# Patient Record
Sex: FEMALE | Race: WHITE | NOT HISPANIC OR LATINO
[De-identification: names, ages, dates, MRNs, and addresses within clinical notes are randomized per-mention and may not be internally consistent; named-entity substitution may affect disease eponyms.]

---

## 2017-03-21 ENCOUNTER — APPOINTMENT (OUTPATIENT)
Dept: GYNECOLOGIC ONCOLOGY | Facility: CLINIC | Age: 67
End: 2017-03-21

## 2017-03-21 VITALS
HEIGHT: 65 IN | WEIGHT: 210 LBS | BODY MASS INDEX: 34.99 KG/M2 | DIASTOLIC BLOOD PRESSURE: 85 MMHG | SYSTOLIC BLOOD PRESSURE: 170 MMHG

## 2017-03-27 ENCOUNTER — RX RENEWAL (OUTPATIENT)
Age: 67
End: 2017-03-27

## 2017-04-04 ENCOUNTER — APPOINTMENT (OUTPATIENT)
Dept: CARDIOLOGY | Facility: CLINIC | Age: 67
End: 2017-04-04

## 2017-04-04 ENCOUNTER — NON-APPOINTMENT (OUTPATIENT)
Age: 67
End: 2017-04-04

## 2017-04-04 VITALS
HEART RATE: 91 BPM | DIASTOLIC BLOOD PRESSURE: 82 MMHG | OXYGEN SATURATION: 94 % | HEIGHT: 65 IN | WEIGHT: 220 LBS | SYSTOLIC BLOOD PRESSURE: 136 MMHG | BODY MASS INDEX: 36.65 KG/M2

## 2017-06-30 ENCOUNTER — APPOINTMENT (OUTPATIENT)
Dept: GYNECOLOGIC ONCOLOGY | Facility: CLINIC | Age: 67
End: 2017-06-30

## 2017-06-30 VITALS
BODY MASS INDEX: 34.99 KG/M2 | WEIGHT: 210 LBS | SYSTOLIC BLOOD PRESSURE: 140 MMHG | DIASTOLIC BLOOD PRESSURE: 82 MMHG | HEIGHT: 65 IN

## 2017-07-02 RX ORDER — LORATADINE 5 MG/5 ML
SOLUTION, ORAL ORAL
Refills: 0 | Status: ACTIVE | COMMUNITY

## 2017-07-12 ENCOUNTER — APPOINTMENT (OUTPATIENT)
Dept: CARDIOLOGY | Facility: CLINIC | Age: 67
End: 2017-07-12

## 2017-07-12 ENCOUNTER — NON-APPOINTMENT (OUTPATIENT)
Age: 67
End: 2017-07-12

## 2017-07-12 VITALS
HEIGHT: 65 IN | DIASTOLIC BLOOD PRESSURE: 81 MMHG | WEIGHT: 222 LBS | HEART RATE: 98 BPM | BODY MASS INDEX: 36.99 KG/M2 | OXYGEN SATURATION: 96 % | SYSTOLIC BLOOD PRESSURE: 156 MMHG

## 2017-08-10 ENCOUNTER — APPOINTMENT (OUTPATIENT)
Dept: CARDIOLOGY | Facility: CLINIC | Age: 67
End: 2017-08-10
Payer: COMMERCIAL

## 2017-08-10 PROCEDURE — 93306 TTE W/DOPPLER COMPLETE: CPT

## 2017-08-24 ENCOUNTER — OUTPATIENT (OUTPATIENT)
Dept: OUTPATIENT SERVICES | Facility: HOSPITAL | Age: 67
LOS: 1 days | End: 2017-08-24
Payer: COMMERCIAL

## 2017-08-24 DIAGNOSIS — Z98.89 OTHER SPECIFIED POSTPROCEDURAL STATES: Chronic | ICD-10-CM

## 2017-08-24 DIAGNOSIS — Z90.710 ACQUIRED ABSENCE OF BOTH CERVIX AND UTERUS: Chronic | ICD-10-CM

## 2017-08-24 DIAGNOSIS — S91.309A UNSPECIFIED OPEN WOUND, UNSPECIFIED FOOT, INITIAL ENCOUNTER: ICD-10-CM

## 2017-08-24 PROCEDURE — G0463: CPT

## 2017-08-30 DIAGNOSIS — Z82.49 FAMILY HISTORY OF ISCHEMIC HEART DISEASE AND OTHER DISEASES OF THE CIRCULATORY SYSTEM: ICD-10-CM

## 2017-08-30 DIAGNOSIS — Z85.42 PERSONAL HISTORY OF MALIGNANT NEOPLASM OF OTHER PARTS OF UTERUS: ICD-10-CM

## 2017-08-30 DIAGNOSIS — F41.9 ANXIETY DISORDER, UNSPECIFIED: ICD-10-CM

## 2017-08-30 DIAGNOSIS — Z89.432 ACQUIRED ABSENCE OF LEFT FOOT: ICD-10-CM

## 2017-08-30 DIAGNOSIS — Z79.899 OTHER LONG TERM (CURRENT) DRUG THERAPY: ICD-10-CM

## 2017-08-30 DIAGNOSIS — E11.40 TYPE 2 DIABETES MELLITUS WITH DIABETIC NEUROPATHY, UNSPECIFIED: ICD-10-CM

## 2017-08-30 DIAGNOSIS — D89.9 DISORDER INVOLVING THE IMMUNE MECHANISM, UNSPECIFIED: ICD-10-CM

## 2017-08-30 DIAGNOSIS — Z83.3 FAMILY HISTORY OF DIABETES MELLITUS: ICD-10-CM

## 2017-08-30 DIAGNOSIS — Z79.82 LONG TERM (CURRENT) USE OF ASPIRIN: ICD-10-CM

## 2017-08-30 DIAGNOSIS — Z90.710 ACQUIRED ABSENCE OF BOTH CERVIX AND UTERUS: ICD-10-CM

## 2017-08-30 DIAGNOSIS — E66.9 OBESITY, UNSPECIFIED: ICD-10-CM

## 2017-08-30 DIAGNOSIS — I10 ESSENTIAL (PRIMARY) HYPERTENSION: ICD-10-CM

## 2017-08-30 DIAGNOSIS — M25.472 EFFUSION, LEFT ANKLE: ICD-10-CM

## 2017-08-30 DIAGNOSIS — Z88.0 ALLERGY STATUS TO PENICILLIN: ICD-10-CM

## 2017-08-30 DIAGNOSIS — M54.30 SCIATICA, UNSPECIFIED SIDE: ICD-10-CM

## 2017-08-30 DIAGNOSIS — Z83.518 FAMILY HISTORY OF OTHER SPECIFIED EYE DISORDER: ICD-10-CM

## 2017-08-30 DIAGNOSIS — E11.51 TYPE 2 DIABETES MELLITUS WITH DIABETIC PERIPHERAL ANGIOPATHY WITHOUT GANGRENE: ICD-10-CM

## 2017-08-30 DIAGNOSIS — R60.9 EDEMA, UNSPECIFIED: ICD-10-CM

## 2017-08-30 DIAGNOSIS — M12.9 ARTHROPATHY, UNSPECIFIED: ICD-10-CM

## 2017-09-12 ENCOUNTER — RX RENEWAL (OUTPATIENT)
Age: 67
End: 2017-09-12

## 2017-09-25 ENCOUNTER — RX RENEWAL (OUTPATIENT)
Age: 67
End: 2017-09-25

## 2017-10-10 ENCOUNTER — APPOINTMENT (OUTPATIENT)
Dept: CARDIOLOGY | Facility: CLINIC | Age: 67
End: 2017-10-10
Payer: COMMERCIAL

## 2017-10-10 ENCOUNTER — NON-APPOINTMENT (OUTPATIENT)
Age: 67
End: 2017-10-10

## 2017-10-10 VITALS
SYSTOLIC BLOOD PRESSURE: 118 MMHG | OXYGEN SATURATION: 93 % | HEIGHT: 65 IN | DIASTOLIC BLOOD PRESSURE: 81 MMHG | WEIGHT: 213 LBS | BODY MASS INDEX: 35.49 KG/M2 | HEART RATE: 84 BPM

## 2017-10-10 PROCEDURE — 93000 ELECTROCARDIOGRAM COMPLETE: CPT

## 2017-10-10 PROCEDURE — 99215 OFFICE O/P EST HI 40 MIN: CPT

## 2017-10-31 ENCOUNTER — APPOINTMENT (OUTPATIENT)
Dept: GYNECOLOGIC ONCOLOGY | Facility: CLINIC | Age: 67
End: 2017-10-31
Payer: COMMERCIAL

## 2017-10-31 VITALS
BODY MASS INDEX: 34.66 KG/M2 | DIASTOLIC BLOOD PRESSURE: 80 MMHG | HEIGHT: 65 IN | WEIGHT: 208 LBS | SYSTOLIC BLOOD PRESSURE: 142 MMHG

## 2017-10-31 PROCEDURE — 99214 OFFICE O/P EST MOD 30 MIN: CPT

## 2018-01-17 ENCOUNTER — APPOINTMENT (OUTPATIENT)
Dept: GYNECOLOGIC ONCOLOGY | Facility: CLINIC | Age: 68
End: 2018-01-17
Payer: COMMERCIAL

## 2018-01-17 VITALS
HEIGHT: 65 IN | DIASTOLIC BLOOD PRESSURE: 72 MMHG | BODY MASS INDEX: 34.16 KG/M2 | WEIGHT: 205 LBS | SYSTOLIC BLOOD PRESSURE: 136 MMHG

## 2018-01-17 PROCEDURE — 99214 OFFICE O/P EST MOD 30 MIN: CPT

## 2018-04-17 ENCOUNTER — APPOINTMENT (OUTPATIENT)
Dept: GYNECOLOGIC ONCOLOGY | Facility: CLINIC | Age: 68
End: 2018-04-17
Payer: COMMERCIAL

## 2018-04-17 VITALS
DIASTOLIC BLOOD PRESSURE: 82 MMHG | SYSTOLIC BLOOD PRESSURE: 145 MMHG | WEIGHT: 208 LBS | HEIGHT: 65 IN | BODY MASS INDEX: 34.66 KG/M2

## 2018-04-17 PROCEDURE — 99214 OFFICE O/P EST MOD 30 MIN: CPT

## 2018-04-17 RX ORDER — METFORMIN HYDROCHLORIDE 625 MG/1
TABLET ORAL
Refills: 0 | Status: DISCONTINUED | COMMUNITY
End: 2018-04-17

## 2018-04-17 RX ORDER — AMLODIPINE BESYLATE AND OLMESARTAN MEDOXOMIL 10; 40 MG/1; MG/1
TABLET, FILM COATED ORAL DAILY
Refills: 0 | Status: DISCONTINUED | COMMUNITY
End: 2018-04-17

## 2018-04-18 ENCOUNTER — APPOINTMENT (OUTPATIENT)
Dept: CARDIOLOGY | Facility: CLINIC | Age: 68
End: 2018-04-18
Payer: COMMERCIAL

## 2018-04-18 ENCOUNTER — NON-APPOINTMENT (OUTPATIENT)
Age: 68
End: 2018-04-18

## 2018-04-18 VITALS
HEIGHT: 65 IN | OXYGEN SATURATION: 96 % | SYSTOLIC BLOOD PRESSURE: 161 MMHG | BODY MASS INDEX: 35.82 KG/M2 | HEART RATE: 78 BPM | WEIGHT: 215 LBS | DIASTOLIC BLOOD PRESSURE: 89 MMHG

## 2018-04-18 PROCEDURE — 93000 ELECTROCARDIOGRAM COMPLETE: CPT

## 2018-04-18 PROCEDURE — 99214 OFFICE O/P EST MOD 30 MIN: CPT

## 2018-07-23 ENCOUNTER — APPOINTMENT (OUTPATIENT)
Dept: GYNECOLOGIC ONCOLOGY | Facility: CLINIC | Age: 68
End: 2018-07-23
Payer: COMMERCIAL

## 2018-07-23 VITALS
BODY MASS INDEX: 34.82 KG/M2 | DIASTOLIC BLOOD PRESSURE: 80 MMHG | SYSTOLIC BLOOD PRESSURE: 125 MMHG | HEIGHT: 65 IN | WEIGHT: 209 LBS

## 2018-07-23 DIAGNOSIS — I89.0 LYMPHEDEMA, NOT ELSEWHERE CLASSIFIED: ICD-10-CM

## 2018-07-23 PROCEDURE — 99214 OFFICE O/P EST MOD 30 MIN: CPT

## 2018-07-29 PROBLEM — I89.0 LYMPHEDEMA: Status: ACTIVE | Noted: 2018-04-17

## 2018-08-29 ENCOUNTER — RX RENEWAL (OUTPATIENT)
Age: 68
End: 2018-08-29

## 2018-09-10 ENCOUNTER — MEDICATION RENEWAL (OUTPATIENT)
Age: 68
End: 2018-09-10

## 2018-11-05 ENCOUNTER — APPOINTMENT (OUTPATIENT)
Dept: GYNECOLOGIC ONCOLOGY | Facility: CLINIC | Age: 68
End: 2018-11-05
Payer: COMMERCIAL

## 2018-11-05 VITALS
WEIGHT: 210 LBS | SYSTOLIC BLOOD PRESSURE: 128 MMHG | BODY MASS INDEX: 34.99 KG/M2 | HEIGHT: 65 IN | DIASTOLIC BLOOD PRESSURE: 82 MMHG

## 2018-11-05 PROCEDURE — 99214 OFFICE O/P EST MOD 30 MIN: CPT

## 2018-11-06 ENCOUNTER — NON-APPOINTMENT (OUTPATIENT)
Age: 68
End: 2018-11-06

## 2018-11-06 ENCOUNTER — APPOINTMENT (OUTPATIENT)
Dept: CARDIOLOGY | Facility: CLINIC | Age: 68
End: 2018-11-06
Payer: COMMERCIAL

## 2018-11-06 VITALS
OXYGEN SATURATION: 94 % | HEART RATE: 94 BPM | DIASTOLIC BLOOD PRESSURE: 83 MMHG | HEIGHT: 65 IN | BODY MASS INDEX: 35.82 KG/M2 | WEIGHT: 215 LBS | SYSTOLIC BLOOD PRESSURE: 136 MMHG

## 2018-11-06 PROCEDURE — 93000 ELECTROCARDIOGRAM COMPLETE: CPT

## 2018-11-06 PROCEDURE — 99214 OFFICE O/P EST MOD 30 MIN: CPT

## 2018-11-06 RX ORDER — MULTIVITAMIN
TABLET ORAL DAILY
Refills: 0 | Status: ACTIVE | COMMUNITY

## 2018-11-06 NOTE — HISTORY OF PRESENT ILLNESS
[FreeTextEntry1] : Lluvia Montoya presented to the office today for a followup cardiovascular evaluation. She was last seen in the office in April.\par \par She is now 68 years old, with a history of hypertension and dyslipidemia. She has what is most likely mild, diet-controlled diabetes. She is not known to have any underlying structural heart disease. In May of 2016 she had hysterectomy, which was complicated by distal arterial embolism. This was felt initially to potentially be a cardiac source of embolism or aortic, but her transesophageal echocardiogram was normal. 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 We evaluated her perioperatively. Her course was uncomplicated.\par \par At the time of the last visit, she was feeling well.  \par \par She continues to deal with lower extremity edema, which was felt to represent venous insufficiency, though she has more recently been told that it might simply represent lymphedema. There has been considerable back and forth on this.  She has been compliant with compression stockings and has been wearing stockings that reach the mid thigh. She reports no chest discomfort or difficulty breathing. She denies orthopnea and PND. She denies palpitations, dizziness and syncope. Her blood pressure has been controlled.  She's been experiencing some muscle cramping, and has been concern that perhaps this could be related to her simvastatin.

## 2018-11-06 NOTE — PHYSICAL EXAM
[General Appearance - Well Developed] : well developed [Normal Appearance] : normal appearance [Well Groomed] : well groomed [General Appearance - Well Nourished] : well nourished [No Deformities] : no deformities [General Appearance - In No Acute Distress] : no acute distress [Normal Conjunctiva] : the conjunctiva exhibited no abnormalities [Eyelids - No Xanthelasma] : the eyelids demonstrated no xanthelasmas [Normal Oral Mucosa] : normal oral mucosa [No Oral Pallor] : no oral pallor [No Oral Cyanosis] : no oral cyanosis [Normal Jugular Venous A Waves Present] : normal jugular venous A waves present [Normal Jugular Venous V Waves Present] : normal jugular venous V waves present [No Jugular Venous Hayward A Waves] : no jugular venous hayward A waves [Respiration, Rhythm And Depth] : normal respiratory rhythm and effort [Exaggerated Use Of Accessory Muscles For Inspiration] : no accessory muscle use [Auscultation Breath Sounds / Voice Sounds] : lungs were clear to auscultation bilaterally [Abdomen Soft] : soft [Abdomen Tenderness] : non-tender [Abdomen Mass (___ Cm)] : no abdominal mass palpated [Abnormal Walk] : normal gait [Gait - Sufficient For Exercise Testing] : the gait was sufficient for exercise testing [Nail Clubbing] : no clubbing of the fingernails [Cyanosis, Localized] : no localized cyanosis [Petechial Hemorrhages (___cm)] : no petechial hemorrhages [Skin Color & Pigmentation] : normal skin color and pigmentation [] : no rash [No Venous Stasis] : no venous stasis [Skin Lesions] : no skin lesions [No Skin Ulcers] : no skin ulcer [No Xanthoma] : no  xanthoma was observed [Oriented To Time, Place, And Person] : oriented to person, place, and time [Affect] : the affect was normal [Mood] : the mood was normal [No Anxiety] : not feeling anxious [Normal Rate] : normal [Normal S1] : normal S1 [Normal S2] : normal S2 [No Murmur] : no murmurs heard [Nonpitting Edema] : nonpitting edema present [S3] : no S3 [S4] : no S4 [Right Carotid Bruit] : no bruit heard over the right carotid [Left Carotid Bruit] : no bruit heard over the left carotid [Right Femoral Bruit] : no bruit heard over the right femoral artery [Left Femoral Bruit] : no bruit heard over the left femoral artery

## 2018-11-06 NOTE — DISCUSSION/SUMMARY
[FreeTextEntry1] : Lluvia has hypertension and dyslipidemia, and diabetes. She is not known to have structural heart disease. She suffered an arterial embolus to the lower extremity, requiring amputation of toes.\par \par Her biggest problem recently has been edema, and whether it represents venous insufficiency or lymphedema, I think our present management strategy with compression stockings as likely to be what we are left with.\par \par Her blood pressure is controlled now.\par \par She will try CoQ-10, and see if that helps. \par \par She will see me in 6 months if all is well.

## 2019-02-08 ENCOUNTER — APPOINTMENT (OUTPATIENT)
Dept: GYNECOLOGIC ONCOLOGY | Facility: CLINIC | Age: 69
End: 2019-02-08
Payer: COMMERCIAL

## 2019-02-08 PROCEDURE — 99213 OFFICE O/P EST LOW 20 MIN: CPT

## 2019-02-09 NOTE — PHYSICAL EXAM
[Abnormal] : Examination of extremities for edema and/or varicosities: Abnormal [Absent] : Adnexa(ae): Absent [Normal] : Recto-Vaginal Exam: Normal [de-identified] : 3+ L>R [de-identified] : neg mucosa but shortened.

## 2019-02-09 NOTE — DISCUSSION/SUMMARY
[Reviewed Clinical Lab Test(s)] : Results of clinical tests were reviewed. [Reviewed Radiology Report(s)] : Radiology reports were reviewed. [FreeTextEntry1] : She remains CARLOS.\par -We discussed her surveillance, the persistent SE of her treatment. We will request Med Onc reports. We discussed her decision re removal of the port, which she is reluctant to do. We discussed the adriana hx of her condition, r/o recurrence, and her ongoing status. For now, she'll keep the port. \par -We have discussed the finding of lipedema.   \par -We discussed her BHM. We discussed her imaging. She has plans for f/u with Dr HAWKINS.   \par -We have discussed the GI eval and imaging as well as HR's rec for f/u. \par -We discussed bone health, and the role of exercise as tolerated, Ca and Vit D supplementation.\par -We reviewed her Ortho issues.\par -Her instructions were reviewed. \par -All Q/A.\par -She'll RTO in 3m. \par

## 2019-02-09 NOTE — HISTORY OF PRESENT ILLNESS
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 Singleton\par PCP: Dr. Theron Pinedo\par Breast Surgeon: Dr. Vang\par Rad Onc: Dr PATEL Ray\par Med Onc: Rossy De Santiago and Chris\par Vasc: Dr White\par \par She RTO feeling well and notes no VB, VD, and pain. She saw HR re the GB polyp and he advised f/u in 1 year. No recent Med Onc reports.\par \par ROS: no GI or  issues. She reports multiple orth issues and recently had a l knee injection. We have discussed the diagnosis of "lipedema with only minimal lymphedema." \par \par CT Jul 2018 - CARLOS, thyroid nodule, GB adenomyomatosis (w/o change [prior reports reviewed]), GB gravel vs stones. We have discussed the report in detail. \par \par US: Aug 2018 - GB polyp.\par \par : May 2017=3. \par \par BHM: She reports seeing SP for a CBE. Mgm/US: Jan 2018 - BIRADS 2.\par DEXA: Jan 2017 - osteopenia. Taking Ca and Vit D.

## 2019-04-15 ENCOUNTER — NON-APPOINTMENT (OUTPATIENT)
Age: 69
End: 2019-04-15

## 2019-04-15 ENCOUNTER — APPOINTMENT (OUTPATIENT)
Dept: CARDIOLOGY | Facility: CLINIC | Age: 69
End: 2019-04-15
Payer: COMMERCIAL

## 2019-04-15 VITALS
WEIGHT: 214 LBS | HEART RATE: 76 BPM | SYSTOLIC BLOOD PRESSURE: 137 MMHG | DIASTOLIC BLOOD PRESSURE: 84 MMHG | OXYGEN SATURATION: 96 % | HEIGHT: 65 IN | BODY MASS INDEX: 35.65 KG/M2

## 2019-04-15 PROCEDURE — 99214 OFFICE O/P EST MOD 30 MIN: CPT

## 2019-04-15 PROCEDURE — 93000 ELECTROCARDIOGRAM COMPLETE: CPT

## 2019-04-15 NOTE — HISTORY OF PRESENT ILLNESS
[FreeTextEntry1] : Lluvia Montoya presented to the office today for a followup cardiovascular evaluation. She was last seen in the office in November.\par \par She is now 68 years old, with a history of hypertension and dyslipidemia. She has what is most likely mild, diet-controlled diabetes. She is not known to have any underlying structural heart disease. In May of 2016 she had hysterectomy, which was complicated by distal arterial embolism. This was felt initially to potentially be a cardiac source of embolism or aortic, but her transesophageal echocardiogram was normal. 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 We evaluated her perioperatively. Her course was uncomplicated.\par \par At the time of the last visit, she was feeling well.  \par \par She continues to deal with lower extremity edema, which is felt to represent a combination of venous insufficiency and lymphedema  She has been compliant with compression stockings and has been wearing stockings that reach the mid thigh. She reports no chest discomfort or difficulty breathing. She denies orthopnea and PND. Her legs feel heavy, and she has had knee issues, at least in part from the edema.  She denies palpitations, dizziness and syncope. Her blood pressure has been controlled. Her port for chemotherapy will be removed in the near future.

## 2019-04-15 NOTE — PHYSICAL EXAM
[General Appearance - Well Developed] : well developed [Normal Appearance] : normal appearance [Well Groomed] : well groomed [General Appearance - Well Nourished] : well nourished [General Appearance - In No Acute Distress] : no acute distress [No Deformities] : no deformities [Normal Conjunctiva] : the conjunctiva exhibited no abnormalities [Normal Oral Mucosa] : normal oral mucosa [Eyelids - No Xanthelasma] : the eyelids demonstrated no xanthelasmas [No Oral Cyanosis] : no oral cyanosis [No Oral Pallor] : no oral pallor [Normal Jugular Venous A Waves Present] : normal jugular venous A waves present [Normal Jugular Venous V Waves Present] : normal jugular venous V waves present [No Jugular Venous Hayward A Waves] : no jugular venous hayward A waves [Respiration, Rhythm And Depth] : normal respiratory rhythm and effort [Exaggerated Use Of Accessory Muscles For Inspiration] : no accessory muscle use [Auscultation Breath Sounds / Voice Sounds] : lungs were clear to auscultation bilaterally [Abdomen Tenderness] : non-tender [Abdomen Soft] : soft [Abdomen Mass (___ Cm)] : no abdominal mass palpated [Abnormal Walk] : normal gait [Gait - Sufficient For Exercise Testing] : the gait was sufficient for exercise testing [Nail Clubbing] : no clubbing of the fingernails [Petechial Hemorrhages (___cm)] : no petechial hemorrhages [Cyanosis, Localized] : no localized cyanosis [Skin Color & Pigmentation] : normal skin color and pigmentation [] : no rash [No Venous Stasis] : no venous stasis [Skin Lesions] : no skin lesions [No Skin Ulcers] : no skin ulcer [No Xanthoma] : no  xanthoma was observed [Oriented To Time, Place, And Person] : oriented to person, place, and time [Affect] : the affect was normal [Mood] : the mood was normal [Normal Rate] : normal [No Anxiety] : not feeling anxious [Normal S2] : normal S2 [Normal S1] : normal S1 [S4] : no S4 [S3] : no S3 [No Murmur] : no murmurs heard [Right Carotid Bruit] : no bruit heard over the right carotid [Left Carotid Bruit] : no bruit heard over the left carotid [Right Femoral Bruit] : no bruit heard over the right femoral artery [Left Femoral Bruit] : no bruit heard over the left femoral artery [Nonpitting Edema] : nonpitting edema present

## 2019-04-15 NOTE — DISCUSSION/SUMMARY
[FreeTextEntry1] : Lluvia has hypertension and dyslipidemia, and diabetes. She is not known to have structural heart disease. She suffered an arterial embolus to the lower extremity, requiring amputation of toes.\par \par Her biggest problem recently has been edema, and whether it represents venous insufficiency or lymphedema, I think our present management strategy with compression stockings as likely to be what we are left with.\par \par Her blood pressure is controlled sufficiently. She has had LVH and we will repeat an echo.\par \par She will see me in 6 months if all is well. She can hold aspirin for a day or tow as requested, prior to removal of the port.

## 2019-04-21 ENCOUNTER — TRANSCRIPTION ENCOUNTER (OUTPATIENT)
Age: 69
End: 2019-04-21

## 2019-04-22 ENCOUNTER — RESULT REVIEW (OUTPATIENT)
Age: 69
End: 2019-04-22

## 2019-04-22 ENCOUNTER — OUTPATIENT (OUTPATIENT)
Dept: OUTPATIENT SERVICES | Facility: HOSPITAL | Age: 69
LOS: 1 days | End: 2019-04-22
Payer: COMMERCIAL

## 2019-04-22 DIAGNOSIS — Z90.710 ACQUIRED ABSENCE OF BOTH CERVIX AND UTERUS: Chronic | ICD-10-CM

## 2019-04-22 DIAGNOSIS — Z98.89 OTHER SPECIFIED POSTPROCEDURAL STATES: Chronic | ICD-10-CM

## 2019-04-22 DIAGNOSIS — Z45.1 ENCOUNTER FOR ADJUSTMENT AND MANAGEMENT OF INFUSION PUMP: ICD-10-CM

## 2019-04-22 LAB — SURGICAL PATHOLOGY STUDY: SIGNIFICANT CHANGE UP

## 2019-04-22 PROCEDURE — 36590 REMOVAL TUNNELED CV CATH: CPT

## 2019-04-22 PROCEDURE — 88300 SURGICAL PATH GROSS: CPT | Mod: 26

## 2019-04-22 PROCEDURE — 88300 SURGICAL PATH GROSS: CPT

## 2019-05-06 ENCOUNTER — APPOINTMENT (OUTPATIENT)
Dept: CARDIOLOGY | Facility: CLINIC | Age: 69
End: 2019-05-06
Payer: COMMERCIAL

## 2019-05-06 PROCEDURE — 93306 TTE W/DOPPLER COMPLETE: CPT

## 2019-05-16 ENCOUNTER — RX RENEWAL (OUTPATIENT)
Age: 69
End: 2019-05-16

## 2019-05-28 ENCOUNTER — RX RENEWAL (OUTPATIENT)
Age: 69
End: 2019-05-28

## 2019-06-03 ENCOUNTER — APPOINTMENT (OUTPATIENT)
Dept: GYNECOLOGIC ONCOLOGY | Facility: CLINIC | Age: 69
End: 2019-06-03
Payer: COMMERCIAL

## 2019-06-03 VITALS
HEIGHT: 65 IN | SYSTOLIC BLOOD PRESSURE: 136 MMHG | DIASTOLIC BLOOD PRESSURE: 80 MMHG | BODY MASS INDEX: 33.32 KG/M2 | WEIGHT: 200 LBS

## 2019-06-03 DIAGNOSIS — R60.0 LOCALIZED EDEMA: ICD-10-CM

## 2019-06-03 PROCEDURE — 99214 OFFICE O/P EST MOD 30 MIN: CPT

## 2019-09-16 ENCOUNTER — APPOINTMENT (OUTPATIENT)
Dept: GYNECOLOGIC ONCOLOGY | Facility: CLINIC | Age: 69
End: 2019-09-16
Payer: COMMERCIAL

## 2019-09-16 PROCEDURE — 99213 OFFICE O/P EST LOW 20 MIN: CPT

## 2019-10-07 ENCOUNTER — APPOINTMENT (OUTPATIENT)
Dept: CARDIOLOGY | Facility: CLINIC | Age: 69
End: 2019-10-07
Payer: COMMERCIAL

## 2019-10-07 ENCOUNTER — NON-APPOINTMENT (OUTPATIENT)
Age: 69
End: 2019-10-07

## 2019-10-07 VITALS
HEIGHT: 65 IN | WEIGHT: 217 LBS | HEART RATE: 80 BPM | BODY MASS INDEX: 36.15 KG/M2 | OXYGEN SATURATION: 94 % | SYSTOLIC BLOOD PRESSURE: 123 MMHG | DIASTOLIC BLOOD PRESSURE: 83 MMHG

## 2019-10-07 PROCEDURE — 93000 ELECTROCARDIOGRAM COMPLETE: CPT

## 2019-10-07 PROCEDURE — 99214 OFFICE O/P EST MOD 30 MIN: CPT

## 2019-10-07 NOTE — HISTORY OF PRESENT ILLNESS
[FreeTextEntry1] : Lluvia David presented to the office today for a followup cardiovascular evaluation. She was last seen in the office in April.\par \par She is now 69 years old, with a history of hypertension and dyslipidemia. She has what is most likely mild, diet-controlled diabetes. She is not known to have any underlying structural heart disease. In May of 2016 she had hysterectomy, which was complicated by distal arterial embolism. This was felt initially to potentially be a cardiac source of embolism or aortic, but her transesophageal echocardiogram was normal. 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 We evaluated her perioperatively. Her course was uncomplicated. She has remained on aspirin.\par \par At the time of the last visit, she was feeling well.  \par \par She continues to deal with lower extremity edema, which is felt to represent a combination of venous insufficiency and lymphedema  She has been compliant with compression stockings and has been wearing stockings that reach the mid thigh. She reports no chest discomfort or difficulty breathing. She denies orthopnea and PND.  She denies palpitations, dizziness and syncope. Her blood pressure has been controlled. Her port for chemotherapy was removed. She has been having some muscular cramping, especially the inner thigh of the right leg. She is concerned that perhaps this is related to simvastatin.

## 2019-10-07 NOTE — PHYSICAL EXAM
[General Appearance - Well Developed] : well developed [Normal Appearance] : normal appearance [Well Groomed] : well groomed [General Appearance - Well Nourished] : well nourished [No Deformities] : no deformities [General Appearance - In No Acute Distress] : no acute distress [Normal Conjunctiva] : the conjunctiva exhibited no abnormalities [Eyelids - No Xanthelasma] : the eyelids demonstrated no xanthelasmas [Normal Oral Mucosa] : normal oral mucosa [No Oral Pallor] : no oral pallor [No Oral Cyanosis] : no oral cyanosis [Normal Jugular Venous A Waves Present] : normal jugular venous A waves present [Normal Jugular Venous V Waves Present] : normal jugular venous V waves present [No Jugular Venous Hayward A Waves] : no jugular venous hayward A waves [Respiration, Rhythm And Depth] : normal respiratory rhythm and effort [Exaggerated Use Of Accessory Muscles For Inspiration] : no accessory muscle use [Auscultation Breath Sounds / Voice Sounds] : lungs were clear to auscultation bilaterally [Abdomen Soft] : soft [Abdomen Tenderness] : non-tender [Abdomen Mass (___ Cm)] : no abdominal mass palpated [Abnormal Walk] : normal gait [Gait - Sufficient For Exercise Testing] : the gait was sufficient for exercise testing [Nail Clubbing] : no clubbing of the fingernails [Cyanosis, Localized] : no localized cyanosis [Petechial Hemorrhages (___cm)] : no petechial hemorrhages [Skin Color & Pigmentation] : normal skin color and pigmentation [] : no rash [No Venous Stasis] : no venous stasis [Skin Lesions] : no skin lesions [No Xanthoma] : no  xanthoma was observed [No Skin Ulcers] : no skin ulcer [Oriented To Time, Place, And Person] : oriented to person, place, and time [Affect] : the affect was normal [Mood] : the mood was normal [No Anxiety] : not feeling anxious [Normal Rate] : normal [Normal S1] : normal S1 [Normal S2] : normal S2 [S3] : no S3 [S4] : no S4 [No Murmur] : no murmurs heard [Right Carotid Bruit] : no bruit heard over the right carotid [Left Carotid Bruit] : no bruit heard over the left carotid [Right Femoral Bruit] : no bruit heard over the right femoral artery [Left Femoral Bruit] : no bruit heard over the left femoral artery [Nonpitting Edema] : nonpitting edema present

## 2019-10-07 NOTE — DISCUSSION/SUMMARY
[FreeTextEntry1] : Lluvia has hypertension and dyslipidemia, and diabetes. She is not known to have structural heart disease. She suffered an arterial embolus to the lower extremity, requiring amputation of toes.\par \par Her biggest problem recently has been edema, and whether it represents venous insufficiency or lymphedema, I think our present management strategy with compression stockings as likely to be what we are left with.\par \par Her blood pressure is controlled sufficiently.  \par \par She will try holding simvastatin. She will call me in the next month, and let me know whether her cramping has resolved. If so, we can consider changing her to an alternative statin, perhaps Crestor.\par \par She will see me in 6 months if all is well.

## 2019-11-19 ENCOUNTER — OTHER (OUTPATIENT)
Age: 69
End: 2019-11-19

## 2019-12-03 ENCOUNTER — APPOINTMENT (OUTPATIENT)
Dept: CARDIOLOGY | Facility: CLINIC | Age: 69
End: 2019-12-03
Payer: COMMERCIAL

## 2019-12-03 PROCEDURE — 93880 EXTRACRANIAL BILAT STUDY: CPT

## 2020-01-22 ENCOUNTER — APPOINTMENT (OUTPATIENT)
Dept: GYNECOLOGIC ONCOLOGY | Facility: CLINIC | Age: 70
End: 2020-01-22
Payer: COMMERCIAL

## 2020-01-22 VITALS
HEART RATE: 88 BPM | WEIGHT: 205 LBS | SYSTOLIC BLOOD PRESSURE: 116 MMHG | HEIGHT: 65 IN | DIASTOLIC BLOOD PRESSURE: 80 MMHG | BODY MASS INDEX: 34.16 KG/M2

## 2020-01-22 PROCEDURE — 99213 OFFICE O/P EST LOW 20 MIN: CPT

## 2020-04-20 ENCOUNTER — APPOINTMENT (OUTPATIENT)
Dept: GYNECOLOGIC ONCOLOGY | Facility: CLINIC | Age: 70
End: 2020-04-20

## 2020-06-02 ENCOUNTER — APPOINTMENT (OUTPATIENT)
Dept: CARDIOLOGY | Facility: CLINIC | Age: 70
End: 2020-06-02
Payer: COMMERCIAL

## 2020-06-02 ENCOUNTER — NON-APPOINTMENT (OUTPATIENT)
Age: 70
End: 2020-06-02

## 2020-06-02 VITALS
HEIGHT: 65 IN | WEIGHT: 220 LBS | OXYGEN SATURATION: 95 % | HEART RATE: 97 BPM | BODY MASS INDEX: 36.65 KG/M2 | SYSTOLIC BLOOD PRESSURE: 141 MMHG | DIASTOLIC BLOOD PRESSURE: 88 MMHG

## 2020-06-02 PROCEDURE — 99214 OFFICE O/P EST MOD 30 MIN: CPT

## 2020-06-02 PROCEDURE — 93000 ELECTROCARDIOGRAM COMPLETE: CPT

## 2020-06-02 NOTE — HISTORY OF PRESENT ILLNESS
[FreeTextEntry1] : Lluvia David presented to the office today for a followup cardiovascular evaluation. She was last seen in the office in October.\par \par She is now 70 years old, with a history of hypertension and dyslipidemia. She has what is most likely mild, diet-controlled diabetes. She is not known to have any underlying structural heart disease. In May of 2016 she had hysterectomy, which was complicated by distal arterial embolism. This was felt initially to potentially be a cardiac source of embolism or aortic, but her transesophageal echocardiogram was normal. 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 We evaluated her perioperatively. Her course was uncomplicated. She has remained on aspirin.\par \par At the time of the last visit, she was feeling well.  \par \par She continues to deal with lower extremity edema, which is felt to represent a combination of venous insufficiency and lymphedema  She has been compliant with compression stockings and has been wearing stockings that reach the mid thigh. She reports no chest discomfort or difficulty breathing. She denies orthopnea and PND.  She denies palpitations, dizziness and syncope. Her blood pressure has been controlled. Her port for chemotherapy was removed. She has been having some muscular cramping, especially the inner thigh of the right leg. This is unchanged.  Her cramping overall is better on Crestor. She is concerned about "blood clots."

## 2020-06-02 NOTE — DISCUSSION/SUMMARY
[FreeTextEntry1] : Lluvia has hypertension and dyslipidemia, and diabetes. She is not known to have structural heart disease. She suffered an arterial embolus to the lower extremity, requiring amputation of toes.\par \par Her biggest problem recently has been edema, and whether it represents venous insufficiency or lymphedema, I think our present management strategy with compression stockings as likely to be what we are left with.\par \par Her blood pressure is high today. It has been better at her PCP.  I will leave things alone for now.\par \par She remains concerned about arterial and venous thrombosis. I have suggested ultrasounds. I will be in contact with her to discuss the results.

## 2020-06-02 NOTE — PHYSICAL EXAM
[General Appearance - Well Developed] : well developed [Normal Appearance] : normal appearance [Well Groomed] : well groomed [No Deformities] : no deformities [General Appearance - Well Nourished] : well nourished [General Appearance - In No Acute Distress] : no acute distress [Normal Conjunctiva] : the conjunctiva exhibited no abnormalities [Eyelids - No Xanthelasma] : the eyelids demonstrated no xanthelasmas [Normal Oral Mucosa] : normal oral mucosa [No Oral Pallor] : no oral pallor [Normal Jugular Venous A Waves Present] : normal jugular venous A waves present [No Oral Cyanosis] : no oral cyanosis [Normal Jugular Venous V Waves Present] : normal jugular venous V waves present [No Jugular Venous Hayward A Waves] : no jugular venous hayward A waves [Respiration, Rhythm And Depth] : normal respiratory rhythm and effort [Abdomen Soft] : soft [Auscultation Breath Sounds / Voice Sounds] : lungs were clear to auscultation bilaterally [Exaggerated Use Of Accessory Muscles For Inspiration] : no accessory muscle use [Abdomen Mass (___ Cm)] : no abdominal mass palpated [Abdomen Tenderness] : non-tender [Gait - Sufficient For Exercise Testing] : the gait was sufficient for exercise testing [Abnormal Walk] : normal gait [Cyanosis, Localized] : no localized cyanosis [Petechial Hemorrhages (___cm)] : no petechial hemorrhages [Nail Clubbing] : no clubbing of the fingernails [Skin Color & Pigmentation] : normal skin color and pigmentation [] : no rash [No Venous Stasis] : no venous stasis [Skin Lesions] : no skin lesions [No Xanthoma] : no  xanthoma was observed [No Skin Ulcers] : no skin ulcer [Mood] : the mood was normal [Oriented To Time, Place, And Person] : oriented to person, place, and time [Affect] : the affect was normal [No Anxiety] : not feeling anxious [Normal Rate] : normal [Normal S2] : normal S2 [Normal S1] : normal S1 [No Murmur] : no murmurs heard [Nonpitting Edema] : nonpitting edema present [S3] : no S3 [S4] : no S4 [Left Carotid Bruit] : no bruit heard over the left carotid [Left Femoral Bruit] : no bruit heard over the left femoral artery [Right Carotid Bruit] : no bruit heard over the right carotid [Right Femoral Bruit] : no bruit heard over the right femoral artery

## 2020-06-08 ENCOUNTER — APPOINTMENT (OUTPATIENT)
Dept: CARDIOLOGY | Facility: CLINIC | Age: 70
End: 2020-06-08
Payer: COMMERCIAL

## 2020-06-08 PROCEDURE — 93970 EXTREMITY STUDY: CPT

## 2020-06-08 PROCEDURE — 93925 LOWER EXTREMITY STUDY: CPT

## 2020-08-31 ENCOUNTER — APPOINTMENT (OUTPATIENT)
Dept: GYNECOLOGIC ONCOLOGY | Facility: CLINIC | Age: 70
End: 2020-08-31
Payer: MEDICARE

## 2020-08-31 VITALS
WEIGHT: 214 LBS | BODY MASS INDEX: 35.65 KG/M2 | HEART RATE: 93 BPM | HEIGHT: 65 IN | SYSTOLIC BLOOD PRESSURE: 142 MMHG | DIASTOLIC BLOOD PRESSURE: 84 MMHG

## 2020-08-31 PROCEDURE — 99214 OFFICE O/P EST MOD 30 MIN: CPT

## 2020-11-04 ENCOUNTER — RX RENEWAL (OUTPATIENT)
Age: 70
End: 2020-11-04

## 2020-11-30 ENCOUNTER — APPOINTMENT (OUTPATIENT)
Dept: GYNECOLOGIC ONCOLOGY | Facility: CLINIC | Age: 70
End: 2020-11-30
Payer: MEDICARE

## 2020-11-30 VITALS
BODY MASS INDEX: 36.99 KG/M2 | WEIGHT: 222 LBS | SYSTOLIC BLOOD PRESSURE: 128 MMHG | HEIGHT: 65 IN | HEART RATE: 108 BPM | DIASTOLIC BLOOD PRESSURE: 76 MMHG

## 2020-11-30 DIAGNOSIS — K82.4 CHOLESTEROLOSIS OF GALLBLADDER: ICD-10-CM

## 2020-11-30 PROCEDURE — 99214 OFFICE O/P EST MOD 30 MIN: CPT

## 2020-12-15 ENCOUNTER — APPOINTMENT (OUTPATIENT)
Dept: CARDIOLOGY | Facility: CLINIC | Age: 70
End: 2020-12-15
Payer: MEDICARE

## 2020-12-15 ENCOUNTER — NON-APPOINTMENT (OUTPATIENT)
Age: 70
End: 2020-12-15

## 2020-12-15 VITALS
OXYGEN SATURATION: 97 % | SYSTOLIC BLOOD PRESSURE: 174 MMHG | HEART RATE: 86 BPM | BODY MASS INDEX: 36.65 KG/M2 | HEIGHT: 65 IN | WEIGHT: 220 LBS | DIASTOLIC BLOOD PRESSURE: 79 MMHG

## 2020-12-15 VITALS — SYSTOLIC BLOOD PRESSURE: 165 MMHG | DIASTOLIC BLOOD PRESSURE: 80 MMHG

## 2020-12-15 PROCEDURE — 93000 ELECTROCARDIOGRAM COMPLETE: CPT

## 2020-12-15 PROCEDURE — 99214 OFFICE O/P EST MOD 30 MIN: CPT

## 2020-12-15 NOTE — DISCUSSION/SUMMARY
[FreeTextEntry1] : Lluvia has hypertension and dyslipidemia, and diabetes. She is not known to have structural heart disease. She suffered an arterial embolus to the lower extremity, requiring amputation of toes.\par \par Her biggest problem recently has been edema, and whether it represents venous insufficiency or lymphedema, I think our present management strategy with compression stockings as likely to be what we are left with.\par \par Her blood pressure is high today. It has been better at her PCP.  I have asked that she start checking her blood pressure at home twice daily over the next 2 weeks.  She will drop off a list of readings.\par \par She will repeat her echocardiogram.  She has had a degree of LVH, somewhat improving over the years.  If her LVH is worse, we might need to be more aggressive with blood pressure control.

## 2020-12-15 NOTE — PHYSICAL EXAM
[General Appearance - Well Developed] : well developed [Normal Appearance] : normal appearance [Well Groomed] : well groomed [General Appearance - Well Nourished] : well nourished [No Deformities] : no deformities [General Appearance - In No Acute Distress] : no acute distress [Normal Conjunctiva] : the conjunctiva exhibited no abnormalities [Eyelids - No Xanthelasma] : the eyelids demonstrated no xanthelasmas [Normal Oral Mucosa] : normal oral mucosa [No Oral Pallor] : no oral pallor [No Oral Cyanosis] : no oral cyanosis [Normal Jugular Venous A Waves Present] : normal jugular venous A waves present [Normal Jugular Venous V Waves Present] : normal jugular venous V waves present [No Jugular Venous Hayward A Waves] : no jugular venous hayward A waves [Respiration, Rhythm And Depth] : normal respiratory rhythm and effort [Exaggerated Use Of Accessory Muscles For Inspiration] : no accessory muscle use [Auscultation Breath Sounds / Voice Sounds] : lungs were clear to auscultation bilaterally [Abdomen Soft] : soft [Abdomen Tenderness] : non-tender [Abdomen Mass (___ Cm)] : no abdominal mass palpated [Abnormal Walk] : normal gait [Gait - Sufficient For Exercise Testing] : the gait was sufficient for exercise testing [Nail Clubbing] : no clubbing of the fingernails [Cyanosis, Localized] : no localized cyanosis [Petechial Hemorrhages (___cm)] : no petechial hemorrhages [Skin Color & Pigmentation] : normal skin color and pigmentation [] : no rash [No Venous Stasis] : no venous stasis [Skin Lesions] : no skin lesions [No Skin Ulcers] : no skin ulcer [No Xanthoma] : no  xanthoma was observed [Oriented To Time, Place, And Person] : oriented to person, place, and time [Affect] : the affect was normal [Mood] : the mood was normal [No Anxiety] : not feeling anxious [Normal Rate] : normal [Normal S1] : normal S1 [Normal S2] : normal S2 [S3] : no S3 [S4] : no S4 [No Murmur] : no murmurs heard [Right Carotid Bruit] : no bruit heard over the right carotid [Left Carotid Bruit] : no bruit heard over the left carotid [Right Femoral Bruit] : no bruit heard over the right femoral artery [Left Femoral Bruit] : no bruit heard over the left femoral artery [Nonpitting Edema] : nonpitting edema present

## 2020-12-15 NOTE — HISTORY OF PRESENT ILLNESS
[FreeTextEntry1] : Lluvia David presented to the office today for a followup cardiovascular evaluation. She was last seen in the office in June.\par \par She is now 70 years old, with a history of hypertension and dyslipidemia. She has what is most likely mild, diet-controlled diabetes. She is not known to have any underlying structural heart disease. In May of 2016 she had hysterectomy, which was complicated by distal arterial embolism. This was felt initially to potentially be a cardiac source of embolism or aortic, but her transesophageal echocardiogram was normal. 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 We evaluated her perioperatively. Her course was uncomplicated. She has remained on aspirin.\par \par At the time of the last visit, she was feeling well.  \par \par She continues to deal with lower extremity edema, which is felt to represent a combination of venous insufficiency and lymphedema  She has been compliant with compression stockings and has been wearing stockings that reach the mid thigh. She reports no chest discomfort or difficulty breathing. She denies orthopnea and PND.  She denies palpitations, dizziness and syncope. Her blood pressure has been controlled. She may go for a relatively minor procedure on her meniscus.

## 2021-01-06 ENCOUNTER — APPOINTMENT (OUTPATIENT)
Dept: CARDIOLOGY | Facility: CLINIC | Age: 71
End: 2021-01-06
Payer: MEDICARE

## 2021-01-06 ENCOUNTER — MED ADMIN CHARGE (OUTPATIENT)
Age: 71
End: 2021-01-06

## 2021-01-06 PROCEDURE — 93306 TTE W/DOPPLER COMPLETE: CPT

## 2021-01-07 RX ORDER — PERFLUTREN 6.52 MG/ML
6.52 INJECTION, SUSPENSION INTRAVENOUS
Qty: 1 | Refills: 0 | Status: COMPLETED | OUTPATIENT
Start: 2021-01-07

## 2021-01-07 RX ADMIN — PERFLUTREN MG/ML: 6.52 INJECTION, SUSPENSION INTRAVENOUS at 00:00

## 2021-03-01 ENCOUNTER — APPOINTMENT (OUTPATIENT)
Dept: UROGYNECOLOGY | Facility: CLINIC | Age: 71
End: 2021-03-01
Payer: MEDICARE

## 2021-03-01 VITALS
WEIGHT: 208 LBS | TEMPERATURE: 96.8 F | DIASTOLIC BLOOD PRESSURE: 70 MMHG | SYSTOLIC BLOOD PRESSURE: 140 MMHG | HEIGHT: 65 IN | BODY MASS INDEX: 34.66 KG/M2

## 2021-03-01 DIAGNOSIS — N95.2 POSTMENOPAUSAL ATROPHIC VAGINITIS: ICD-10-CM

## 2021-03-01 DIAGNOSIS — Z82.49 FAMILY HISTORY OF ISCHEMIC HEART DISEASE AND OTHER DISEASES OF THE CIRCULATORY SYSTEM: ICD-10-CM

## 2021-03-01 DIAGNOSIS — Z78.9 OTHER SPECIFIED HEALTH STATUS: ICD-10-CM

## 2021-03-01 DIAGNOSIS — Z83.438 FAMILY HISTORY OF OTHER DISORDER OF LIPOPROTEIN METABOLISM AND OTHER LIPIDEMIA: ICD-10-CM

## 2021-03-01 DIAGNOSIS — Z86.79 PERSONAL HISTORY OF OTHER DISEASES OF THE CIRCULATORY SYSTEM: ICD-10-CM

## 2021-03-01 DIAGNOSIS — N39.3 STRESS INCONTINENCE (FEMALE) (MALE): ICD-10-CM

## 2021-03-01 DIAGNOSIS — Z87.39 PERSONAL HISTORY OF OTHER DISEASES OF THE MUSCULOSKELETAL SYSTEM AND CONNECTIVE TISSUE: ICD-10-CM

## 2021-03-01 DIAGNOSIS — Z85.42 PERSONAL HISTORY OF MALIGNANT NEOPLASM OF OTHER PARTS OF UTERUS: ICD-10-CM

## 2021-03-01 LAB
BILIRUB UR QL STRIP: NORMAL
CLARITY UR: CLEAR
COLLECTION METHOD: NORMAL
GLUCOSE UR-MCNC: NORMAL
HCG UR QL: 0.2 EU/DL
HGB UR QL STRIP.AUTO: NORMAL
KETONES UR-MCNC: NORMAL
LEUKOCYTE ESTERASE UR QL STRIP: NORMAL
NITRITE UR QL STRIP: NORMAL
PH UR STRIP: 5
PROT UR STRIP-MCNC: NORMAL
SP GR UR STRIP: 1.02

## 2021-03-01 PROCEDURE — 99204 OFFICE O/P NEW MOD 45 MIN: CPT

## 2021-03-01 PROCEDURE — 81003 URINALYSIS AUTO W/O SCOPE: CPT | Mod: QW

## 2021-03-02 PROBLEM — Z85.42 HISTORY OF ENDOMETRIAL CANCER: Status: RESOLVED | Noted: 2021-03-02 | Resolved: 2021-03-02

## 2021-03-02 PROBLEM — Z86.79 HISTORY OF HYPERTENSION: Status: RESOLVED | Noted: 2021-03-02 | Resolved: 2021-03-02

## 2021-03-02 PROBLEM — Z83.438 FAMILY HISTORY OF HYPERLIPIDEMIA: Status: ACTIVE | Noted: 2021-03-02

## 2021-03-02 PROBLEM — Z82.49 FAMILY HISTORY OF CARDIAC DISORDER: Status: ACTIVE | Noted: 2021-03-02

## 2021-03-02 PROBLEM — Z82.49 FAMILY HISTORY OF HYPERTENSION: Status: ACTIVE | Noted: 2021-03-02

## 2021-03-02 PROBLEM — Z87.39 HISTORY OF BACK PAIN: Status: RESOLVED | Noted: 2021-03-02 | Resolved: 2021-03-02

## 2021-03-02 PROBLEM — Z78.9 RARELY CONSUMES ALCOHOL: Status: ACTIVE | Noted: 2021-03-02

## 2021-03-02 RX ORDER — METFORMIN HYDROCHLORIDE 625 MG/1
TABLET ORAL
Refills: 0 | Status: ACTIVE | COMMUNITY

## 2021-03-02 RX ORDER — FOLIC ACID 20 MG
CAPSULE ORAL
Refills: 0 | Status: ACTIVE | COMMUNITY

## 2021-03-02 RX ORDER — CYANOCOBALAMIN (VITAMIN B-12) 1000 MCG
1000 TABLET ORAL
Refills: 0 | Status: ACTIVE | COMMUNITY

## 2021-03-02 RX ORDER — GABAPENTIN 300 MG
300 TABLET ORAL
Refills: 0 | Status: ACTIVE | COMMUNITY

## 2021-03-02 RX ORDER — ASPIRIN 81 MG
81 TABLET, DELAYED RELEASE (ENTERIC COATED) ORAL
Refills: 0 | Status: ACTIVE | COMMUNITY

## 2021-03-02 NOTE — PHYSICAL EXAM
[Chaperone Present] : A chaperone was present in the examining room during all aspects of the physical examination [No Acute Distress] : in no acute distress [Well developed] : well developed [Well Nourished] : ~L well nourished [Oriented x3] : oriented to person, place, and time [Warm and Dry] : was warm and dry to touch [Normal Gait] : gait was normal [Normal Appearance] : general appearance was normal [Atrophy] : atrophy [Normal rectal exam] : was normal [Normal] : normal external genitalia [Absent] : absent [Adnexa Absent] : absent bilaterally [TVL ____] : TVL  [unfilled] [Vulvar Atrophy] : vulvar atrophy [Post Void Residual ____ml] : post void residual was [unfilled] ml [FreeTextEntry1] : ROS as per questionnaire.\par  [Mass (___ Cm)] : no ~M [unfilled] abdominal mass was palpated [Tenderness] : ~T no ~M abdominal tenderness observed [Distended] : not distended [Inguinal LAD] : no adenopathy was noted in the inguinal lymph nodes [FreeTextEntry4] : Approximately 3cm in length

## 2021-03-02 NOTE — HISTORY OF PRESENT ILLNESS
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 Coreas as well as adjuvant chemo and radiation therapy

## 2021-03-02 NOTE — DISCUSSION/SUMMARY
[FreeTextEntry1] : I reviewed the above findings with the patient. We discussed possible stress incontinence versus overactive bladder. We discussed that her symptoms seem more consistent with stress incontinence with a possible component of intrinsic sphincteric deficiency as she has a history of pelvic radiation. Reviewed management options including  no intervention, pelvic floor exercise and possible pelvic floor PT, as well as possible urethral bulking agents. Reviewed potential for surgical management, however discussed that surgical management is not recommended given her history of pelvic radiation. Therefore we discussed possible urethral bulking, if she is interested in such. We discussed returning for urodynamic testing, and a cystoscopy, and she is interested in such. Patient was provided with [facility] information sheet regarding stress urinary incontinence and OAB. She would like to consider her options for now.  All questions were

## 2021-03-08 ENCOUNTER — APPOINTMENT (OUTPATIENT)
Dept: GYNECOLOGIC ONCOLOGY | Facility: CLINIC | Age: 71
End: 2021-03-08
Payer: MEDICARE

## 2021-03-08 VITALS — SYSTOLIC BLOOD PRESSURE: 152 MMHG | HEIGHT: 65 IN | HEART RATE: 87 BPM | DIASTOLIC BLOOD PRESSURE: 81 MMHG

## 2021-03-08 PROCEDURE — 99214 OFFICE O/P EST MOD 30 MIN: CPT

## 2021-06-07 ENCOUNTER — APPOINTMENT (OUTPATIENT)
Dept: GYNECOLOGIC ONCOLOGY | Facility: CLINIC | Age: 71
End: 2021-06-07
Payer: MEDICARE

## 2021-06-07 VITALS
HEART RATE: 91 BPM | WEIGHT: 213.13 LBS | DIASTOLIC BLOOD PRESSURE: 83 MMHG | HEIGHT: 65 IN | BODY MASS INDEX: 35.51 KG/M2 | SYSTOLIC BLOOD PRESSURE: 146 MMHG

## 2021-06-07 DIAGNOSIS — Z87.39 PERSONAL HISTORY OF OTHER DISEASES OF THE MUSCULOSKELETAL SYSTEM AND CONNECTIVE TISSUE: ICD-10-CM

## 2021-06-07 PROCEDURE — 99214 OFFICE O/P EST MOD 30 MIN: CPT

## 2021-07-08 ENCOUNTER — NON-APPOINTMENT (OUTPATIENT)
Age: 71
End: 2021-07-08

## 2021-07-08 ENCOUNTER — APPOINTMENT (OUTPATIENT)
Dept: CARDIOLOGY | Facility: CLINIC | Age: 71
End: 2021-07-08
Payer: MEDICARE

## 2021-07-08 VITALS
SYSTOLIC BLOOD PRESSURE: 138 MMHG | HEART RATE: 76 BPM | WEIGHT: 212 LBS | OXYGEN SATURATION: 97 % | HEIGHT: 65 IN | BODY MASS INDEX: 35.32 KG/M2 | DIASTOLIC BLOOD PRESSURE: 83 MMHG

## 2021-07-08 PROCEDURE — 93000 ELECTROCARDIOGRAM COMPLETE: CPT

## 2021-07-08 PROCEDURE — 99214 OFFICE O/P EST MOD 30 MIN: CPT

## 2021-07-08 NOTE — DISCUSSION/SUMMARY
[FreeTextEntry1] : Lluvia has hypertension and dyslipidemia, and diabetes. She is not known to have structural heart disease. She suffered an arterial embolus to the lower extremity, requiring amputation of toes.\par \par Her biggest problem recently has been edema, and whether it represents venous insufficiency or lymphedema, I think our present management strategy with compression stockings as likely to be what we are left with.\par \par Her blood pressure is high today. It has been better at her PCP.  She will see me in 6 months.

## 2021-07-08 NOTE — HISTORY OF PRESENT ILLNESS
[FreeTextEntry1] : Lluvia David presented to the office today for a followup cardiovascular evaluation. She was last seen in the office in December.\par \par She is now 71 years old, with a history of hypertension and dyslipidemia. She has what is most likely mild, diet-controlled diabetes. She is not known to have any underlying structural heart disease. In May of 2016 she had hysterectomy, which was complicated by distal arterial embolism. This was felt initially to potentially be a cardiac source of embolism or aortic, but her transesophageal echocardiogram was normal. 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 We evaluated her perioperatively. Her course was uncomplicated. She has remained on aspirin.\par \par At the time of the last visit, she was feeling well.  \par \par She continues to deal with lower extremity edema, which is felt to represent a combination of venous insufficiency and lymphedema  She has been compliant with compression stockings and has been wearing stockings that reach the mid thigh, though she is not wearing them today. She reports no chest discomfort or difficulty breathing. She denies orthopnea and PND.  She denies palpitations, dizziness and syncope. Her blood pressure has been controlled.

## 2021-09-03 ENCOUNTER — NON-APPOINTMENT (OUTPATIENT)
Age: 71
End: 2021-09-03

## 2021-09-13 ENCOUNTER — APPOINTMENT (OUTPATIENT)
Dept: GYNECOLOGIC ONCOLOGY | Facility: CLINIC | Age: 71
End: 2021-09-13
Payer: MEDICARE

## 2021-09-13 VITALS
HEART RATE: 90 BPM | WEIGHT: 208 LBS | HEIGHT: 65 IN | DIASTOLIC BLOOD PRESSURE: 84 MMHG | BODY MASS INDEX: 34.66 KG/M2 | SYSTOLIC BLOOD PRESSURE: 131 MMHG

## 2021-09-13 DIAGNOSIS — M62.89 OTHER SPECIFIED DISORDERS OF MUSCLE: ICD-10-CM

## 2021-09-13 DIAGNOSIS — N39.46 MIXED INCONTINENCE: ICD-10-CM

## 2021-09-13 PROCEDURE — 99213 OFFICE O/P EST LOW 20 MIN: CPT

## 2021-11-29 ENCOUNTER — RX RENEWAL (OUTPATIENT)
Age: 71
End: 2021-11-29

## 2021-12-13 ENCOUNTER — APPOINTMENT (OUTPATIENT)
Dept: GYNECOLOGIC ONCOLOGY | Facility: CLINIC | Age: 71
End: 2021-12-13
Payer: MEDICARE

## 2021-12-13 VITALS
SYSTOLIC BLOOD PRESSURE: 144 MMHG | BODY MASS INDEX: 35.49 KG/M2 | DIASTOLIC BLOOD PRESSURE: 82 MMHG | HEART RATE: 91 BPM | HEIGHT: 65 IN | WEIGHT: 213 LBS

## 2021-12-13 PROCEDURE — 99213 OFFICE O/P EST LOW 20 MIN: CPT

## 2022-01-10 ENCOUNTER — APPOINTMENT (OUTPATIENT)
Dept: CARDIOLOGY | Facility: CLINIC | Age: 72
End: 2022-01-10
Payer: MEDICARE

## 2022-01-10 ENCOUNTER — NON-APPOINTMENT (OUTPATIENT)
Age: 72
End: 2022-01-10

## 2022-01-10 VITALS
HEART RATE: 74 BPM | DIASTOLIC BLOOD PRESSURE: 82 MMHG | HEIGHT: 65 IN | WEIGHT: 213 LBS | SYSTOLIC BLOOD PRESSURE: 132 MMHG | OXYGEN SATURATION: 98 % | BODY MASS INDEX: 35.49 KG/M2

## 2022-01-10 PROCEDURE — 99214 OFFICE O/P EST MOD 30 MIN: CPT

## 2022-01-10 PROCEDURE — 93000 ELECTROCARDIOGRAM COMPLETE: CPT

## 2022-01-10 RX ORDER — PANTOPRAZOLE 40 MG/1
40 TABLET, DELAYED RELEASE ORAL
Qty: 90 | Refills: 0 | Status: ACTIVE | COMMUNITY
Start: 2021-12-17

## 2022-01-10 NOTE — HISTORY OF PRESENT ILLNESS
[FreeTextEntry1] : Lluvia David presented to the office today for a followup cardiovascular evaluation. She was last seen in the office in July.\par \par She is now 71 years old, with a history of hypertension and dyslipidemia. She has what is most likely mild, diet-controlled diabetes. She is not known to have any underlying structural heart disease. In May of 2016 she had hysterectomy, which was complicated by distal arterial embolism. This was felt initially to potentially be a cardiac source of embolism or aortic, but her transesophageal echocardiogram was normal. 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 We evaluated her perioperatively. Her course was uncomplicated. She has remained on aspirin.\par \par At the time of the last visit, she was feeling well.  \par \par She continues to deal with lower extremity edema, which is felt to represent a combination of venous insufficiency and lymphedema  She has been compliant with compression stockings and has been wearing stockings that reach the mid thigh, though she is not wearing them today. She reports no chest discomfort or difficulty breathing. She denies orthopnea and PND.  She denies palpitations, dizziness and syncope. Her blood pressure has been controlled.   She had an EGD and colonoscopy and was diagnosed with GERD, and started on pantoprazole. She had a bone density and was found to have osteoporosis, whereas it was previously osteopenia.  She is now on calcium 500mg daily, and some other medication is being considered.  She has been having nocturnal cramping in her calves.

## 2022-01-10 NOTE — DISCUSSION/SUMMARY
[FreeTextEntry1] : Lluvia has hypertension and dyslipidemia, and diabetes. She is not known to have structural heart disease. She suffered an arterial embolus to the lower extremity, requiring amputation of toes.\par \par Her biggest problem recently has been edema, and whether it represents venous insufficiency or lymphedema, I think our present management strategy with compression stockings as likely to be what we are left with.\par \par Her blood pressure is decent today. I\par \par I do not think that her circulation is a problem leading to cramping of her legs.  I suggested co-Q10.\par \par She will see me in 6 months.

## 2022-03-14 ENCOUNTER — APPOINTMENT (OUTPATIENT)
Dept: GYNECOLOGIC ONCOLOGY | Facility: CLINIC | Age: 72
End: 2022-03-14
Payer: MEDICARE

## 2022-03-14 VITALS — DIASTOLIC BLOOD PRESSURE: 90 MMHG | HEART RATE: 85 BPM | HEIGHT: 65 IN | SYSTOLIC BLOOD PRESSURE: 145 MMHG

## 2022-03-14 PROCEDURE — 99213 OFFICE O/P EST LOW 20 MIN: CPT

## 2022-03-25 ENCOUNTER — RX RENEWAL (OUTPATIENT)
Age: 72
End: 2022-03-25

## 2022-04-02 NOTE — REASON FOR VISIT
Gastroenterology Initial Consult Note               Author:  BEL Rae Date & Time Created: 4/2/2022 3:09 PM       Patient ID:  Name:             Licha Pope  YOB: 1936  Age:                 85 y.o.  female  MRN:               [id number]      Referring Provider:  Mando Del Rio MD      Presenting Chief Complaint:  Abnormal imaging      History of Present Illness:    She is an 85-year-old female patient seen in consultation for abnormal imaging of the esophagus.  The patient was originally admitted on 3/25/2022.  She had been admitted in February for abdominal pain with terminal ileitis, small bowel obstruction, and pancreatitis.  The patient states that her pain never really went away and she was reevaluated on 25 March.  She was found to have repeat small bowel obstruction and she underwent exploratory laparotomy on 3/30/2022 with findings ileal mass and an ileocecostomy was performed.  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  CT chest was performed with demonstrated mediastinal collection of fluid and gas measuring 3.5 x 2.6 x 4.2 cm adjacent to an abnormal thickened (word is missing from radiology dictation).  Underlying mass or inflammatory change is a consideration though previously this had a more benign appearance.  She is also found to have small bilateral pleural effusions and bilateral atelectasis.    Dr. Perez and I reviewed imaging back 2015 where she has been mentioned multiple times to have a hiatal hernia and one particular CT on 11/17/2015 that mentions a fluid and gas collection right of the distal thoracic esophagus measuring 2.6 x 2.7 cm containing mostly gas.      Review of Systems:  Review of Systems   Constitutional: Positive for malaise/fatigue and weight loss. Negative for chills and fever.   HENT: Negative for hearing loss and tinnitus.    Eyes: Negative for pain and discharge.    Respiratory: Negative for cough and wheezing.    Cardiovascular: Negative for chest pain and leg swelling.   Gastrointestinal: Positive for abdominal pain, constipation and nausea. Negative for vomiting.   Genitourinary: Negative for dysuria and urgency.   Musculoskeletal: Negative for falls and joint pain.   Skin: Negative for itching and rash.   Neurological: Positive for weakness. Negative for dizziness.   Endo/Heme/Allergies: Negative for environmental allergies and polydipsia.   Psychiatric/Behavioral: Negative for memory loss. The patient does not have insomnia.              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]    • Hypophosphatemia [E83.39]    • Acute hypoxemic respiratory failure ([facility]) [J96.01]    • Intraabdominal mass [R19.00]    • Hypernatremia [E87.0]    • Generalized weakness [R53.1]    • Hyperglycemia [R73.9]    • SBO (small bowel obstruction) ([facility]) [K56.609]    • NATIVIDAD (acute kidney injury) ([facility]) [N17.9]    • Abdominal pain [R10.9]    • Anemia [D64.9]    • History of deep venous thrombosis [Z86.718]    • COPD (chronic obstructive pulmonary disease) ([facility]) [J44.9]    • Essential hypertension, benign [I10]          Past Surgical History:  Past Surgical History:   Procedure Laterality Date   • AZ LAP,DIAGNOSTIC ABDOMEN Bilateral 3/30/2022    Procedure: LAPAROSCOPY;  Surgeon: Mando Johnson M.D.;  Location: SURGERY [facility];  Service: General   • AZ EXPLORATORY OF ABDOMEN Bilateral 3/30/2022    Procedure: LAPAROTOMY, EXPLORATORY, ILEOCECECTOMY;  Surgeon: Mando Johnson M.D.;  Location: SURGERY [facility];  Service: General    • CT LAP,APPENDECTOMY  2021    Procedure: APPENDECTOMY, LAPAROSCOPIC;  Surgeon: Pedro Mcneil M.D.;  Location: [facility];  Service: Gastroenterology   • CATARACT PHACO WITH IOL  2014    Performed by Jarred Almazan M.D. at [facility]   • CATARACT PHACO WITH IOL  2014    Performed by Jarred Almazan M.D. at [facility]   • KNEE ARTHROPLASTY TOTAL  2014    Performed by Jose Hahn M.D. at [facility]   • CT  DELIVERY ONLY     • HEMORRHOIDECTOMY             Hospital Medications:  Current Facility-Administered Medications   Medication Dose Frequency Provider Last Rate Last Admin   • enoxaparin (LOVENOX) inj 40 mg  40 mg DAILY Mando Del Rio M.D.   40 mg at 22 0924   • TPN Adult Central Line   TPN DAILY Mando Del Rio M.D.       • TPN Adult Central Line   TPN DAILY Mando Del Rio M.D. 83 mL/hr at 22 192 New Bag at 22   • dextrose 10% infusion 250 mL  250 mL PRN Mando Del Rio M.D.       • insulin regular (HumuLIN R,NovoLIN R) injection  2-10 Units Q6HRS Mando Del Rio M.D.   2 Units at 22 0514   • MD Alert...TPN per Pharmacy   PHARMACY TO DOSE Mando Del Rio M.D.       • ondansetron (ZOFRAN) syringe/vial injection 4 mg  4 mg Q8HRS PRN Mando Del Rio M.D.   4 mg at 22 1655   • metoclopramide (REGLAN) injection 10 mg  10 mg Q4HRS PRN Irving Benoit D.O.       • pantoprazole (Protonix) injection 40 mg  40 mg BID Mando Del Rio M.D.   40 mg at 22 0501   • benzocaine-menthol (Cepacol) lozenge 1 Lozenge  1 Lozenge Q8HRS PRN Mando Del Rio M.D.       • HYDROmorphone (Dilaudid) injection 0.5 mg  0.5 mg Q3HRS PRN Dolores Sanchez M.D.       • labetalol (NORMODYNE/TRANDATE) injection 10 mg  10 mg Q4HRS PRN Dolores Sanchez M.D.       • [Held by provider] valsartan  (DIOVAN) tablet 160 mg  160 mg DAILY Dolores Sanchez M.D.       Last reviewed on 3/30/2022  4:38 PM by China Savage R.N.        Current Outpatient Medications:  Medications Prior to Admission   Medication Sig Dispense Refill Last Dose   • valsartan (DIOVAN) 160 MG Tab Take 1 Tablet by mouth every day. 30 Tablet 1 3/24/2022 at am   • apixaban (ELIQUIS) 5mg Tab Take 1 tablet by mouth 2 times a day. 60 tablet 0 3/24/2022 at pm   • CALCIUM-MAGNESIUM-ZINC PO Take 1 tablet by mouth every day.   3/24/2022 at am   • therapeutic multivitamin-minerals (THERAGRAN-M) Tab Take 1 tablet by mouth every day.   3/24/2022 at am   • Multiple Vitamins-Minerals (LUTEIN-ZEAXANTHIN PO) Take 1 tablet by mouth every day.   3/24/2022 at am   • TURMERIC PO Take 2,000 mg by mouth every day.   3/24/2022 at am   • Ascorbic Acid (VITAMIN C PO) Take 1,000 mg by mouth every day.   3/24/2022 at am   • Cholecalciferol (D3 PO) Take 5,000 Units by mouth every day.   3/24/2022 at am   • GLUCOSAMINE HCL-MSM PO Take 2 Tablets by mouth every day. Move Free Ultra supplement   3/24/2022 at am         Medication Allergies:  Allergies   Allergen Reactions   • Codeine Vomiting   • Demerol Vomiting     Injectable type   • Shellfish Allergy Shortness of Breath     Soft shell   • Ciprofloxacin Rash     rash   • Ibuprofen      Patient developed gastric ulcer/GI bleed from ibuprofen use   • Iodine      PATIENT ALLERGIC TO SHELLFISH, NOT SURE IF SHE IS ALLERGIC TO IODINE         Family Medical History:  Family History   Problem Relation Age of Onset   • Alzheimer's Disease Mother    • Heart Disease Father    • Other Father         AAA   • Heart Attack Sister    • Diabetes Brother          Social History:  Social History     Socioeconomic History   • Marital status:      Spouse name: Not on file   • Number of children: Not on file   • Years of education: Not on file   • Highest education level: Not on file   Occupational History   • Not on file  "  Tobacco Use   • Smoking status: Former Smoker     Packs/day: 1.00     Years: 20.00     Pack years: 20.00     Types: Cigarettes     Quit date: 1975     Years since quittin.2   • Smokeless tobacco: Never Used   Vaping Use   • Vaping Use: Never used   Substance and Sexual Activity   • Alcohol use: Not Currently     Alcohol/week: 1.5 oz     Types: 3 Standard drinks or equivalent per week     Comment: RARE   • Drug use: No   • Sexual activity: Not Currently     Partners: Male     Comment: , retired    Other Topics Concern   • Not on file   Social History Narrative   • Not on file     Social Determinants of Health     Financial Resource Strain: Low Risk    • Difficulty of Paying Living Expenses: Not hard at all   Food Insecurity: No Food Insecurity   • Worried About Running Out of Food in the Last Year: Never true   • Ran Out of Food in the Last Year: Never true   Transportation Needs: No Transportation Needs   • Lack of Transportation (Medical): No   • Lack of Transportation (Non-Medical): No   Physical Activity: Not on file   Stress: Not on file   Social Connections: Not on file   Intimate Partner Violence: Not on file   Housing Stability: Not on file         Vital signs:  Weight/BMI: Body mass index is 21.65 kg/m².  /74   Pulse 89   Temp 36.7 °C (98 °F) (Oral)   Resp 16   Ht 1.626 m (5' 4\")   Wt 57.2 kg (126 lb 1.7 oz)   SpO2 93%   Vitals:    22 1600 22 2300 22 0509 22 1000   BP: 149/73 134/70 160/79 151/74   Pulse: 93 99 94 89   Resp: 16 16 16 16   Temp: 37.6 °C (99.7 °F) 38 °C (100.4 °F) 37.7 °C (99.9 °F) 36.7 °C (98 °F)   TempSrc: Oral Temporal Temporal Oral   SpO2: 91% 92% 90% 93%   Weight:       Height:         Oxygen Therapy:  Pulse Oximetry: 93 %, O2 (LPM): 0, O2 Delivery Device: None - Room Air    Intake/Output Summary (Last 24 hours) at 2022 1509  Last data filed at 2022 1236  Gross per 24 hour   Intake --   Output 152 ml   Net -152 ml "         Physical Exam:  Physical Exam  Vitals and nursing note reviewed.   Constitutional:       Appearance: Normal appearance.   HENT:      Head: Normocephalic and atraumatic.      Right Ear: External ear normal.      Left Ear: External ear normal.      Nose: Nose normal. No congestion.      Mouth/Throat:      Mouth: Mucous membranes are dry.      Pharynx: Oropharynx is clear.   Eyes:      General: No scleral icterus.     Extraocular Movements: Extraocular movements intact.      Pupils: Pupils are equal, round, and reactive to light.   Cardiovascular:      Rate and Rhythm: Normal rate and regular rhythm.      Pulses: Normal pulses.      Heart sounds: Normal heart sounds. No murmur heard.  Pulmonary:      Effort: Pulmonary effort is normal. No respiratory distress.      Breath sounds: Normal breath sounds. No wheezing.   Abdominal:      General: There is distension.      Tenderness: There is abdominal tenderness (Diffuse).      Comments: Prevena in place   Musculoskeletal:      Cervical back: Neck supple.      Right lower leg: No edema.      Left lower leg: No edema.   Lymphadenopathy:      Cervical: No cervical adenopathy.   Neurological:      General: No focal deficit present.      Mental Status: She is alert and oriented to person, place, and time.   Psychiatric:         Thought Content: Thought content normal.         Judgment: Judgment normal.           Labs:  Recent Labs     03/31/22 0447 04/01/22  0316 04/02/22  1240   SODIUM 147* 146* 137   POTASSIUM 4.1 3.8 4.5   CHLORIDE 116* 114* 101   CO2 24 25 28   BUN 8 9 9   CREATININE 0.66 0.62 0.56   MAGNESIUM  --  1.9 2.1   PHOSPHORUS  --  1.3* 2.6   CALCIUM 7.5* 7.7* 8.2*     Recent Labs     03/31/22  0447 04/01/22  0316 04/02/22  1240   ALTSGPT <5 7 6   ASTSGOT 14 15 15   ALKPHOSPHAT 24* 40 32   TBILIRUBIN 0.3 0.3 0.6   GLUCOSE 177* 169* 130*     Recent Labs     03/31/22  0447 04/01/22  0316 04/02/22  1000 04/02/22  1240   WBC 9.1 9.6 11.2*  --    NEUTSPOLYS  88.10* 81.70* 83.00*  --    LYMPHOCYTES 5.40* 5.80* 6.50*  --    MONOCYTES 5.80 7.80 5.90  --    EOSINOPHILS 0.00 3.50 3.00  --    BASOPHILS 0.30 0.30 0.30  --    ASTSGOT 14 15  --  15   ALTSGPT <5 7  --  6   ALKPHOSPHAT 24* 40  --  32   TBILIRUBIN 0.3 0.3  --  0.6     Recent Labs     03/31/22  0447 04/01/22  0316 04/02/22  1000   RBC 2.82* 3.10* 3.60*   HEMOGLOBIN 8.9* 9.8* 11.7*   HEMATOCRIT 29.2* 31.5* 37.4   PLATELETCT 204 232 208     Recent Results (from the past 24 hour(s))   POCT glucose device results    Collection Time: 04/01/22  5:33 PM   Result Value Ref Range    POC Glucose, Blood 131 (H) 65 - 99 mg/dL   POCT glucose device results    Collection Time: 04/02/22 12:26 AM   Result Value Ref Range    POC Glucose, Blood 147 (H) 65 - 99 mg/dL   CBC WITH DIFFERENTIAL    Collection Time: 04/02/22 10:00 AM   Result Value Ref Range    WBC 11.2 (H) 4.8 - 10.8 K/uL    RBC 3.60 (L) 4.20 - 5.40 M/uL    Hemoglobin 11.7 (L) 12.0 - 16.0 g/dL    Hematocrit 37.4 37.0 - 47.0 %    .9 (H) 81.4 - 97.8 fL    MCH 32.5 27.0 - 33.0 pg    MCHC 31.3 (L) 33.6 - 35.0 g/dL    RDW 48.0 35.9 - 50.0 fL    Platelet Count 208 164 - 446 K/uL    MPV 11.8 9.0 - 12.9 fL    Neutrophils-Polys 83.00 (H) 44.00 - 72.00 %    Lymphocytes 6.50 (L) 22.00 - 41.00 %    Monocytes 5.90 0.00 - 13.40 %    Eosinophils 3.00 0.00 - 6.90 %    Basophils 0.30 0.00 - 1.80 %    Immature Granulocytes 1.30 (H) 0.00 - 0.90 %    Nucleated RBC 0.00 /100 WBC    Neutrophils (Absolute) 9.26 (H) 2.00 - 7.15 K/uL    Lymphs (Absolute) 0.72 (L) 1.00 - 4.80 K/uL    Monos (Absolute) 0.66 0.00 - 0.85 K/uL    Eos (Absolute) 0.33 0.00 - 0.51 K/uL    Baso (Absolute) 0.03 0.00 - 0.12 K/uL    Immature Granulocytes (abs) 0.15 (H) 0.00 - 0.11 K/uL    NRBC (Absolute) 0.00 K/uL   IONIZED CALCIUM    Collection Time: 04/02/22 10:00 AM   Result Value Ref Range    Ionized Calcium 1.12 1.10 - 1.30 mmol/L   Comp Metabolic Panel    Collection Time: 04/02/22 12:40 PM   Result Value Ref Range     Sodium 137 135 - 145 mmol/L    Potassium 4.5 3.6 - 5.5 mmol/L    Chloride 101 96 - 112 mmol/L    Co2 28 20 - 33 mmol/L    Anion Gap 8.0 7.0 - 16.0    Glucose 130 (H) 65 - 99 mg/dL    Bun 9 8 - 22 mg/dL    Creatinine 0.56 0.50 - 1.40 mg/dL    Calcium 8.2 (L) 8.4 - 10.2 mg/dL    AST(SGOT) 15 12 - 45 U/L    ALT(SGPT) 6 2 - 50 U/L    Alkaline Phosphatase 32 30 - 99 U/L    Total Bilirubin 0.6 0.1 - 1.5 mg/dL    Albumin 2.6 (L) 3.2 - 4.9 g/dL    Total Protein 5.3 (L) 6.0 - 8.2 g/dL    Globulin 2.7 1.9 - 3.5 g/dL    A-G Ratio 1.0 g/dL   MAGNESIUM    Collection Time: 04/02/22 12:40 PM   Result Value Ref Range    Magnesium 2.1 1.5 - 2.5 mg/dL   PHOSPHORUS    Collection Time: 04/02/22 12:40 PM   Result Value Ref Range    Phosphorus 2.6 2.5 - 4.5 mg/dL   ESTIMATED GFR    Collection Time: 04/02/22 12:40 PM   Result Value Ref Range    GFR (CKD-EPI) 89 >60 mL/min/1.73 m 2         Radiology Review:  CT-CHEST (THORAX) WITH   Final Result      1.  Mediastinal collection of fluid and gas adjacent to an abnormal thickened . Underlying mass or inflammatory change is a consideration though previously this had a more benign appearance. Suggest further assessment with endoscopy.   2.  Small BILATERAL pleural effusions   3.  BILATERAL atelectasis      JOSEP ORTIZ was paged at 4/2/2022 10:19 AM.         DX-CHEST-PORTABLE (1 VIEW)   Final Result      Decreased lung volumes with small left subpulmonic effusion and likely basilar atelectasis      IR-PICC LINE PLACEMENT W/ GUIDANCE > AGE 5   Final Result                  Ultrasound-guided PICC placement performed by qualified nursing staff as    above.          DX-SMALL BOWEL SERIES   Final Result      Partial small bowel obstruction with contrast transit time to the colon exceeding 9 hours      5 cm paraesophageal hernia      NG tube terminates over the gastric fundus      DX-ABDOMEN FOR TUBE PLACEMENT   Final Result         Gastric drainage tube with tip projecting over the  expected area of the stomach.      [id number] VIEW   Final Result      Long segmental small bowel dilatation concerning for distal small bowel obstruction      DX-ABDOMEN FOR TUBE PLACEMENT   Final Result      Enteric tube terminates over the stomach.      Small bowel loop dilatation concerning for small bowel obstruction      DX-ABDOMEN FOR TUBE PLACEMENT   Final Result         Gastric drainage tube with tip projecting over the expected area of the stomach.      DX-ABDOMEN FOR TUBE PLACEMENT   Final Result         Gastric drainage tube loops in the esophagus with tip projecting over the expected area of the upper esophagus.      CT-ABDOMEN-PELVIS WITH   Final Result      1.  There are similar changes of bowel wall thickening involving the terminal ileum and base of the cecum adjacent to postoperative change from prior appendectomy which could be infectious or inflammatory.   2.  There is mild proximal obstruction involving the ileum and distal jejunum in the lower abdomen and left mid abdomen with small bowel loops dilated up to 4 cm.   3.  There is no gross free air or pneumatosis.   4.  Moderate size hiatal hernia again seen.   5.  Minimally prominent biliary tree unchanged and physiologic, unchanged dating back to 2015.   6.  Simple hepatic cysts are unchanged.         DX-CHEST-PORTABLE (1 VIEW)   Final Result      1.  There is no acute cardiopulmonary process.            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]    • Hypophosphatemia [E83.39]    • Acute hypoxemic respiratory failure (HCC) [J96.01]    • Intraabdominal mass [R19.00]    • Hypernatremia [E87.0]    • Generalized weakness [R53.1]    • Hyperglycemia [R73.9]    • SBO (small bowel  obstruction) ([facility]) [K56.609]    • NATIVIDAD (acute kidney injury) ([facility]) [N17.9]    • Abdominal pain [R10.9]    • Anemia [D64.9]    • History of deep venous thrombosis [Z86.718]    • COPD (chronic obstructive pulmonary disease) ([facility]) [J44.9]    • Essential hypertension, benign [I10]            Assessment/Recommendations:  85-year-old female who presented to the hospital with abdominal pain found to have small bowel obstruction and recently diagnosed ileal adenocarcinoma.  CT chest demonstrated a fluid collection adjacent to the esophagus, but after further review of past imaging this has been mentioned as far back as 2015.  Do not suspect  an acute perforation or fistulous tract given the chronicity.  No plans for EGD at this time, however once patient is able to have oral intake recommend Gastrografin esophagram to evaluate further.  Endoscopy could be performed in the future if concerning findings.    Assessment:  -Small bowel obstruction  -ileal adenocarcinoma status post ex lap and resection  -Fluid collection adjacent to the esophagus on imaging-likely esophageal diverticulum or hernia related.  Do not have a high suspicion of malignancy or esophageal perforation given chronicity and similar findings on imaging in 2015.  -Acute hypoxemic respiratory failure  -Hyponatremia  -COPD    Plan:  -Gastrografin esophagram in 1 to 2 days once patient has oral intake and bowel movements indicating that small bowel obstruction has essentially resolved  -Consider EGD in the future depending on findings of Gastrografin esophagram    GI team will be back on Monday, 4/4/2022 to reevaluate patient        BEL Rae      Thank you for inviting me to participate in the care of this patient. Please do not hesitate to call GI consultants with additional questions/concerns or changes in the patient's clinical status at [phone].      Core Quality Measures   Reviewed items:  Labs, Medications and Radiology reports  reviewed           [Follow-Up - Clinic] : a clinic follow-up of [Hyperlipidemia] : hyperlipidemia [Hypertension] : hypertension

## 2022-04-27 ENCOUNTER — APPOINTMENT (OUTPATIENT)
Dept: OTOLARYNGOLOGY | Facility: CLINIC | Age: 72
End: 2022-04-27
Payer: MEDICARE

## 2022-04-27 ENCOUNTER — RESULT REVIEW (OUTPATIENT)
Age: 72
End: 2022-04-27

## 2022-04-27 VITALS
WEIGHT: 204 LBS | HEART RATE: 86 BPM | SYSTOLIC BLOOD PRESSURE: 139 MMHG | HEIGHT: 65 IN | BODY MASS INDEX: 33.99 KG/M2 | DIASTOLIC BLOOD PRESSURE: 80 MMHG

## 2022-04-27 DIAGNOSIS — R09.82 POSTNASAL DRIP: ICD-10-CM

## 2022-04-27 DIAGNOSIS — Z83.3 FAMILY HISTORY OF DIABETES MELLITUS: ICD-10-CM

## 2022-04-27 DIAGNOSIS — J32.9 CHRONIC SINUSITIS, UNSPECIFIED: ICD-10-CM

## 2022-04-27 DIAGNOSIS — H69.82 OTHER SPECIFIED DISORDERS OF EUSTACHIAN TUBE, LEFT EAR: ICD-10-CM

## 2022-04-27 DIAGNOSIS — I10 ESSENTIAL (PRIMARY) HYPERTENSION: ICD-10-CM

## 2022-04-27 PROCEDURE — 70486 CT MAXILLOFACIAL W/O DYE: CPT

## 2022-04-27 PROCEDURE — 92557 COMPREHENSIVE HEARING TEST: CPT

## 2022-04-27 PROCEDURE — 92550 TYMPANOMETRY & REFLEX THRESH: CPT

## 2022-04-27 PROCEDURE — 99204 OFFICE O/P NEW MOD 45 MIN: CPT | Mod: 25

## 2022-04-27 PROCEDURE — 31231 NASAL ENDOSCOPY DX: CPT

## 2022-04-27 RX ORDER — FLUTICASONE PROPIONATE 50 UG/1
50 SPRAY, METERED NASAL
Qty: 3 | Refills: 3 | Status: ACTIVE | COMMUNITY
Start: 2022-04-27 | End: 1900-01-01

## 2022-04-27 RX ORDER — AZELASTINE HYDROCHLORIDE 137 UG/1
137 SPRAY, METERED NASAL TWICE DAILY
Qty: 3 | Refills: 3 | Status: ACTIVE | COMMUNITY
Start: 2022-04-27 | End: 1900-01-01

## 2022-04-27 NOTE — PHYSICAL EXAM
[Hearing Calhoun Test (Tuning Fork On Forehead)] : no lateralization of tone [Normal] : mucosa is normal [Midline] : trachea located in midline position [Removed] : palatine tonsils previously removed [de-identified] : mild TMJ tenderness [de-identified] : sclerotic and slightly retracted [de-identified] : mildly deviated septum. airway open [de-identified] : inflamed turb [de-identified] : uvula gone. erythema of lateral bands consistent with PND [FreeTextEntry2] : tenderness over left frontal sinus

## 2022-04-27 NOTE — HISTORY OF PRESENT ILLNESS
[de-identified] : The patient presents with h/o chronic sinusitis, b/l SNHL- wears b/l amplification. Pt c/o clogged and pressure sensation in both ears, PND, rhinitis, and frontal sinus pressure. Pt had recent episode of similar symptoms last month and was diagnosed with a sinus infection and given a Z-ester and Advil Cold and Sinus. Pt never restarted on Claritin after completing course of abx. Pt has never had allergy testing done. Pt reports that she gets a sinus infection about once a year.

## 2022-04-27 NOTE — ADDENDUM
[FreeTextEntry1] : Documented by Shlomo Freitas acting as scribe for Dr. Brewster on 04/27/2022.\par \par All Medical record entries made by the Scribe were at my, Dr. Brewster, direction and personally dictated by me on 04/27/2022. I have reviewed the chart and agree that the record accurately reflects my personal performance of the history, physical exam, assessment and plan. I have also personally directed, reviewed, and agreed with the discharge instructions.

## 2022-04-27 NOTE — ASSESSMENT
[FreeTextEntry1] : Reviewed and reconciled medications, allergies, PMHx, PSHx, SocHx, FMHx. \par \par h/o chronic sinusitis, b/l SNHL- wears b/l amplification. \par clogged and pressure sensation in both ears\par PND\par rhinitis\par \par FNE findings: left side with Deviated septum pushing up against middle turb, spur to the left. Right side with compressed middle turb against lateral wall. No obvious polyps, growth or purulent discharge. 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 sinuses otherwise clear. inferior turbinate hypertrophy. mastoids clear. \par \par Plan:\par Audio - results interpreted by Dr. Brewster and reviewed with the patient. Mini CT sinus - interpreted by Dr. Brewster and reviewed with the patient, pending radiologist review. Flexible Nasal Endoscopy. Flonase- 2 sprays bilaterally, once a day, spray laterally. Astelin - 2 sprays bilaterally BID, spray laterally.

## 2022-04-27 NOTE — PROCEDURE
[Recalcitrant Symptoms] : recalcitrant symptoms  [Anterior rhinoscopy insufficient to account for symptoms] : anterior rhinoscopy insufficient to account for symptoms [None] : none [Flexible Endoscope] : examined with the flexible endoscope [FreeTextEntry6] : Procedure: Flexible Nasal Endoscopy: Risks, benefits, and alternatives of flexible endoscopy were explained to the patient. The patient gave oral consent to proceed. The flexible scope was inserted into the left nasal cavity. Endoscopy of the inferior and middle meatus was performed. Deviated septum pushing up against middle turb, spur to the left. No polyp, mass, or lesion was appreciated. Olfactory cleft was clear. Spheno-ethmoid recess is clear. Nasopharynx was clear. Turbinates were without mass. The procedure was repeated on the contralateral side finding the right side with compressed middle turb against lateral wall. No obvious polyps, growth or purulent discharge.

## 2022-04-27 NOTE — CONSULT LETTER
[Dear  ___] : Dear  [unfilled], [Consult Letter:] : I had the pleasure of evaluating your patient, [unfilled]. [Please see my note below.] : Please see my note below. [Consult Closing:] : Thank you very much for allowing me to participate in the care of this patient.  If you have any questions, please do not hesitate to contact me. [Sincerely,] : Sincerely, [FreeTextEntry3] : Jay Brewster MD FACS

## 2022-04-27 NOTE — DATA REVIEWED
[de-identified] : \par -TYMPS: TYPE A AU (ETF WNL AD, ABNORMAL AS)\par -HEARING WNL  HZ, ESSENTIALLY MILD SLOPING TO MODERATE SNHL 500-8000 HZ AU\par RECS: 1) ENT F/U 2)RE-EVAL AS PER MD 3)CONTINUE USE OF BINAURAL AMP; HAC WITH OUTSIDE AUDIOLOGIST

## 2022-06-27 ENCOUNTER — APPOINTMENT (OUTPATIENT)
Dept: GYNECOLOGIC ONCOLOGY | Facility: CLINIC | Age: 72
End: 2022-06-27

## 2022-06-27 VITALS
DIASTOLIC BLOOD PRESSURE: 81 MMHG | WEIGHT: 216 LBS | BODY MASS INDEX: 35.99 KG/M2 | HEART RATE: 90 BPM | HEIGHT: 65 IN | SYSTOLIC BLOOD PRESSURE: 171 MMHG

## 2022-06-27 VITALS
BODY MASS INDEX: 35.99 KG/M2 | DIASTOLIC BLOOD PRESSURE: 81 MMHG | SYSTOLIC BLOOD PRESSURE: 171 MMHG | HEIGHT: 65 IN | HEART RATE: 90 BPM | WEIGHT: 216 LBS

## 2022-06-27 PROCEDURE — 99213 OFFICE O/P EST LOW 20 MIN: CPT

## 2022-07-14 ENCOUNTER — APPOINTMENT (OUTPATIENT)
Dept: CARDIOLOGY | Facility: CLINIC | Age: 72
End: 2022-07-14

## 2022-07-14 ENCOUNTER — NON-APPOINTMENT (OUTPATIENT)
Age: 72
End: 2022-07-14

## 2022-07-14 VITALS
HEART RATE: 94 BPM | DIASTOLIC BLOOD PRESSURE: 79 MMHG | SYSTOLIC BLOOD PRESSURE: 169 MMHG | HEIGHT: 65 IN | BODY MASS INDEX: 35.65 KG/M2 | WEIGHT: 214 LBS | OXYGEN SATURATION: 93 %

## 2022-07-14 VITALS — SYSTOLIC BLOOD PRESSURE: 155 MMHG | DIASTOLIC BLOOD PRESSURE: 80 MMHG

## 2022-07-14 PROCEDURE — 93000 ELECTROCARDIOGRAM COMPLETE: CPT

## 2022-07-14 PROCEDURE — 99214 OFFICE O/P EST MOD 30 MIN: CPT

## 2022-07-14 NOTE — DISCUSSION/SUMMARY
[FreeTextEntry1] : Lluvia has hypertension and dyslipidemia, and diabetes. She is not known to have structural heart disease. She suffered an arterial embolus to the lower extremity, requiring amputation of toes.\par \par Her biggest problem recently has been edema, and whether it represents venous insufficiency or lymphedema, I think our present management strategy with compression stockings as likely to be what we are left with.\par \par Her blood pressure is quite elevated today.  It has also been quite elevated when she had it checked with her primary care physician recently.  She will start checking it at home.  It is quite possible that she will require some additional medication for her blood pressure.\par \par I reviewed with her her most recent echocardiogram, carotid ultrasound and venous Doppler from the last couple of years.  I do not think we need to repeat anything at this time, but I would consider repeating them in approximately 1 year from now.\par \par If all is well, she will see me in 6 months.

## 2022-07-14 NOTE — HISTORY OF PRESENT ILLNESS
[FreeTextEntry1] : Lluvia David presented to the office today for a followup cardiovascular evaluation. She was last seen in the office 6 months ago.\par \par She is now 72 years old, with a history of hypertension and dyslipidemia. She has what is most likely mild, diet-controlled diabetes. She is not known to have any underlying structural heart disease. In May of 2016 she had hysterectomy, which was complicated by distal arterial embolism. This was felt initially to potentially be a cardiac source of embolism or aortic, but her transesophageal echocardiogram was normal. 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 We evaluated her perioperatively. Her course was uncomplicated. She has remained on aspirin.\par \par At the time of the last visit, she was feeling well.  \par \par She continues to deal with lower extremity edema, which is felt to represent a combination of venous insufficiency and lymphedema  She has been compliant with compression stockings and has been wearing stockings that reach the mid thigh, though she is not wearing them today. She reports no chest discomfort or difficulty breathing. She denies orthopnea and PND.  She denies palpitations, dizziness and syncope.  She does not check her blood pressure regularly.  It was elevated at her most recent primary care physician's visit.

## 2022-09-15 ENCOUNTER — NON-APPOINTMENT (OUTPATIENT)
Age: 72
End: 2022-09-15

## 2022-10-03 ENCOUNTER — APPOINTMENT (OUTPATIENT)
Dept: GYNECOLOGIC ONCOLOGY | Facility: CLINIC | Age: 72
End: 2022-10-03

## 2022-10-03 VITALS
SYSTOLIC BLOOD PRESSURE: 151 MMHG | DIASTOLIC BLOOD PRESSURE: 86 MMHG | HEART RATE: 98 BPM | BODY MASS INDEX: 35.65 KG/M2 | WEIGHT: 214 LBS | HEIGHT: 65 IN

## 2022-10-03 DIAGNOSIS — M81.0 AGE-RELATED OSTEOPOROSIS W/OUT CURRENT PATHOLOGICAL FRACTURE: ICD-10-CM

## 2022-10-03 PROCEDURE — 99213 OFFICE O/P EST LOW 20 MIN: CPT

## 2022-12-19 ENCOUNTER — NON-APPOINTMENT (OUTPATIENT)
Age: 72
End: 2022-12-19

## 2023-01-11 ENCOUNTER — APPOINTMENT (OUTPATIENT)
Dept: CARDIOLOGY | Facility: CLINIC | Age: 73
End: 2023-01-11
Payer: MEDICARE

## 2023-01-11 PROCEDURE — 93970 EXTREMITY STUDY: CPT

## 2023-01-17 ENCOUNTER — APPOINTMENT (OUTPATIENT)
Dept: CARDIOLOGY | Facility: CLINIC | Age: 73
End: 2023-01-17
Payer: MEDICARE

## 2023-01-17 ENCOUNTER — NON-APPOINTMENT (OUTPATIENT)
Age: 73
End: 2023-01-17

## 2023-01-17 VITALS
WEIGHT: 205 LBS | OXYGEN SATURATION: 99 % | HEART RATE: 73 BPM | SYSTOLIC BLOOD PRESSURE: 132 MMHG | HEIGHT: 65 IN | BODY MASS INDEX: 34.16 KG/M2 | DIASTOLIC BLOOD PRESSURE: 84 MMHG

## 2023-01-17 PROCEDURE — 99214 OFFICE O/P EST MOD 30 MIN: CPT

## 2023-01-17 PROCEDURE — 93000 ELECTROCARDIOGRAM COMPLETE: CPT

## 2023-01-17 NOTE — HISTORY OF PRESENT ILLNESS
[FreeTextEntry1] : Lluvia David presented to the office today for a followup cardiovascular evaluation. She was last seen in the office 6 months ago.\par \par She is now 72 years old, with a history of hypertension and dyslipidemia. She has diabetes. She is not known to have any underlying structural heart disease. In May of 2016 she had hysterectomy, which was complicated by distal arterial embolism. This was felt initially to potentially be a cardiac source of embolism or aortic, but her transesophageal echocardiogram was normal. 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 We evaluated her perioperatively. Her course was uncomplicated. She has remained on aspirin.\par \par At the time of the last visit, she was feeling well.  \par \par She continues to deal with lower extremity edema, which is felt to represent a combination of venous insufficiency and lymphedema  She has been compliant with compression stockings and has been wearing stockings that reach the mid thigh, though she is not wearing them today. She reports no chest discomfort or difficulty breathing. She denies orthopnea and PND.  She denies palpitations, dizziness and syncope.  She does not check her blood pressure regularly. She noted a density on the anterior surface of her left thigh.  An ultrasound was unrevealing. She has lost 15 pounds on Thuy Gabe, in part to avoid going on more medication for her diabetes..

## 2023-01-17 NOTE — DISCUSSION/SUMMARY
[FreeTextEntry1] : Lluvia has hypertension and dyslipidemia, and diabetes. She is not known to have structural heart disease. She suffered an arterial embolus to the lower extremity, requiring amputation of toes.\par \par Her biggest problem recently has been edema, and whether it represents venous insufficiency or lymphedema, I think our present management strategy with compression stockings as likely to be what we are left with.\par \par Her blood pressure is less elevated today.   \par I reviewed with her her most recent echocardiogram from January 2021, carotid ultrasound from December 2019 and venous Doppler from January 2023.  \par \par If all is well, she will see me in 6 months.

## 2023-02-06 ENCOUNTER — RX RENEWAL (OUTPATIENT)
Age: 73
End: 2023-02-06

## 2023-02-06 ENCOUNTER — APPOINTMENT (OUTPATIENT)
Dept: GYNECOLOGIC ONCOLOGY | Facility: CLINIC | Age: 73
End: 2023-02-06
Payer: MEDICARE

## 2023-02-17 ENCOUNTER — APPOINTMENT (OUTPATIENT)
Dept: GYNECOLOGIC ONCOLOGY | Facility: CLINIC | Age: 73
End: 2023-02-17
Payer: MEDICARE

## 2023-02-17 VITALS
SYSTOLIC BLOOD PRESSURE: 135 MMHG | DIASTOLIC BLOOD PRESSURE: 84 MMHG | WEIGHT: 202 LBS | HEART RATE: 87 BPM | BODY MASS INDEX: 33.66 KG/M2 | HEIGHT: 65 IN

## 2023-02-17 DIAGNOSIS — R10.31 RIGHT LOWER QUADRANT PAIN: ICD-10-CM

## 2023-02-17 PROCEDURE — 99214 OFFICE O/P EST MOD 30 MIN: CPT

## 2023-02-18 PROBLEM — R10.31 RIGHT INGUINAL PAIN: Status: ACTIVE | Noted: 2020-08-31

## 2023-03-06 ENCOUNTER — APPOINTMENT (OUTPATIENT)
Dept: PLASTIC SURGERY | Facility: CLINIC | Age: 73
End: 2023-03-06
Payer: MEDICARE

## 2023-03-10 ENCOUNTER — APPOINTMENT (OUTPATIENT)
Dept: PLASTIC SURGERY | Facility: CLINIC | Age: 73
End: 2023-03-10
Payer: MEDICARE

## 2023-03-10 VITALS
WEIGHT: 195 LBS | HEART RATE: 83 BPM | HEIGHT: 65 IN | SYSTOLIC BLOOD PRESSURE: 117 MMHG | BODY MASS INDEX: 32.49 KG/M2 | TEMPERATURE: 98 F | OXYGEN SATURATION: 99 % | DIASTOLIC BLOOD PRESSURE: 73 MMHG

## 2023-03-10 PROCEDURE — 99213 OFFICE O/P EST LOW 20 MIN: CPT

## 2023-03-10 NOTE — CONSULT LETTER
[Dear  ___] : Dear  [unfilled], [Courtesy Letter:] : I had the pleasure of seeing your patient, [unfilled], in my office today. [Please see my note below.] : Please see my note below. [Sincerely,] : Sincerely, [DrVerna  ___] : Dr. MITCHELL [DrVerna ___] : Dr. MITCHELL [FreeTextEntry3] : Susan M. Palleschi, MD, FACS\par Division of Breast Surgery\par Director, Breast Surgery\par [facility]\par [address] [place] [address]\par [address]\[place] [address]\par (Phone) [phone]\par (Fax) [phone]

## 2023-03-10 NOTE — HISTORY OF PRESENT ILLNESS
[FreeTextEntry1] : Patient is a 72 year old female here today for a follow up visit. \par She has a history of a benign left mammo wire localization breast biopsy\par She has a family history of breast cancer in her maternal grandmother\par 2/4/2023 Bilateral mammogram/ultrasound: benign, BI-RADS 1/2\par GYNO/ONC: Dr. Coreas, sees him every 3-4 months for H/O endometrial cancer, all stable\par She denies any current breast concerns \par Up to date with MDs \par

## 2023-03-10 NOTE — PHYSICAL EXAM
[Normocephalic] : normocephalic [Atraumatic] : atraumatic [EOMI] : extra ocular movement intact [Sclera nonicteric] : sclera nonicteric [Supple] : supple [No Supraclavicular Adenopathy] : no supraclavicular adenopathy [Examined in the supine and seated position] : examined in the supine and seated position [No dominant masses] : no dominant masses in right breast  [No dominant masses] : no dominant masses left breast [No Nipple Retraction] : no left nipple retraction [No Nipple Discharge] : no left nipple discharge [No Axillary Lymphadenopathy] : no left axillary lymphadenopathy [No Edema] : no edema [No Rashes] : no rashes [No Ulceration] : no ulceration [No Cervical Adenopathy] : no cervical adenopathy [No Thyromegaly] : no thyromegaly [de-identified] : Upper inner scar with post-op retraction without change.

## 2023-03-10 NOTE — ASSESSMENT
[FreeTextEntry1] : Family history of breast cancer\par Clinical breast exam negative \par \par 1. Annual bilateral mammogram due 2/2024\par 2. Follow up office visit due in 1 year\par 3. Advised monthly self breast examinations and advised her to contact me if she has any concerns. \par \par Patient seen and examined with my PA Valeria Martin present

## 2023-03-27 ENCOUNTER — RX RENEWAL (OUTPATIENT)
Age: 73
End: 2023-03-27

## 2023-06-05 ENCOUNTER — APPOINTMENT (OUTPATIENT)
Dept: MRI IMAGING | Facility: CLINIC | Age: 73
End: 2023-06-05
Payer: MEDICARE

## 2023-06-05 ENCOUNTER — OUTPATIENT (OUTPATIENT)
Dept: OUTPATIENT SERVICES | Facility: HOSPITAL | Age: 73
LOS: 1 days | End: 2023-06-05
Payer: MEDICARE

## 2023-06-05 DIAGNOSIS — C55 MALIGNANT NEOPLASM OF UTERUS, PART UNSPECIFIED: ICD-10-CM

## 2023-06-05 DIAGNOSIS — R10.31 RIGHT LOWER QUADRANT PAIN: ICD-10-CM

## 2023-06-05 PROCEDURE — A9585: CPT

## 2023-06-05 PROCEDURE — 72197 MRI PELVIS W/O & W/DYE: CPT

## 2023-06-05 PROCEDURE — 72197 MRI PELVIS W/O & W/DYE: CPT | Mod: 26,MH

## 2023-06-12 ENCOUNTER — NON-APPOINTMENT (OUTPATIENT)
Age: 73
End: 2023-06-12

## 2023-06-12 ENCOUNTER — APPOINTMENT (OUTPATIENT)
Dept: GYNECOLOGIC ONCOLOGY | Facility: CLINIC | Age: 73
End: 2023-06-12
Payer: MEDICARE

## 2023-06-12 VITALS
DIASTOLIC BLOOD PRESSURE: 81 MMHG | HEART RATE: 78 BPM | BODY MASS INDEX: 32.82 KG/M2 | HEIGHT: 65 IN | WEIGHT: 197 LBS | SYSTOLIC BLOOD PRESSURE: 128 MMHG

## 2023-06-12 DIAGNOSIS — C55 MALIGNANT NEOPLASM OF UTERUS, PART UNSPECIFIED: ICD-10-CM

## 2023-06-12 PROCEDURE — 99213 OFFICE O/P EST LOW 20 MIN: CPT

## 2023-07-11 ENCOUNTER — NON-APPOINTMENT (OUTPATIENT)
Age: 73
End: 2023-07-11

## 2023-07-11 ENCOUNTER — APPOINTMENT (OUTPATIENT)
Dept: CARDIOLOGY | Facility: CLINIC | Age: 73
End: 2023-07-11
Payer: MEDICARE

## 2023-07-11 VITALS
DIASTOLIC BLOOD PRESSURE: 80 MMHG | WEIGHT: 200 LBS | SYSTOLIC BLOOD PRESSURE: 126 MMHG | HEIGHT: 65 IN | BODY MASS INDEX: 33.32 KG/M2 | OXYGEN SATURATION: 98 % | HEART RATE: 77 BPM

## 2023-07-11 PROCEDURE — 99214 OFFICE O/P EST MOD 30 MIN: CPT

## 2023-07-11 PROCEDURE — 93000 ELECTROCARDIOGRAM COMPLETE: CPT

## 2023-07-11 NOTE — DISCUSSION/SUMMARY
[FreeTextEntry1] : Lluvia has hypertension and dyslipidemia, and diabetes. She is not known to have structural heart disease. She suffered an arterial embolus to the lower extremity, requiring amputation of toes.\par \par Her biggest problem recently has been edema, and whether it represents venous insufficiency or lymphedema, I think our present management strategy with compression stockings as likely to be what we are left with.\par \par Her blood pressure is better overall.  I would not change her medications at this time.\par \par I reviewed with her her most recent echocardiogram from January 2021, carotid ultrasound from December 2019 and venous Doppler from January 2023.  \par \par If all is well, she will see me in 6 months.  I would consider another echocardiogram and carotid ultrasound at that time.

## 2023-07-11 NOTE — HISTORY OF PRESENT ILLNESS
[FreeTextEntry1] : Lluvia David presented to the office today for a followup cardiovascular evaluation. She was last seen in the office 6 months ago.\par \par She is now 73 years old, with a history of hypertension and dyslipidemia. She has diabetes. She is not known to have any underlying structural heart disease. In May of 2016 she had hysterectomy, which was complicated by distal arterial embolism. This was felt initially to potentially be a cardiac source of embolism or aortic, but her transesophageal echocardiogram was normal. 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 We evaluated her perioperatively. Her course was uncomplicated. She has remained on aspirin.\par \par At the time of the last visit, she was feeling well.  \par \par She continues to deal with lower extremity edema, which is felt to represent a combination of venous insufficiency and lymphedema  She has been compliant with compression stockings and has been wearing stockings that reach the mid thigh, though she is not wearing them today. She reports no chest discomfort or difficulty breathing. She denies orthopnea and PND.  She denies palpitations, dizziness and syncope.  She does not check her blood pressure regularly.A recent MRI of the pelvis revealed renal cysts, but no concerning findings. She lost more weight, and her A1C has improved down to 6.6.

## 2023-07-11 NOTE — PHYSICAL EXAM
[General Appearance - Well Developed] : well developed [Normal Appearance] : normal appearance [Well Groomed] : well groomed [General Appearance - Well Nourished] : well nourished [No Deformities] : no deformities [General Appearance - In No Acute Distress] : no acute distress [Normal Conjunctiva] : the conjunctiva exhibited no abnormalities [Eyelids - No Xanthelasma] : the eyelids demonstrated no xanthelasmas [Normal Oral Mucosa] : normal oral mucosa [No Oral Pallor] : no oral pallor [No Oral Cyanosis] : no oral cyanosis [Normal Jugular Venous A Waves Present] : normal jugular venous A waves present [Normal Jugular Venous V Waves Present] : normal jugular venous V waves present [No Jugular Venous Hayward A Waves] : no jugular venous hayward A waves [Respiration, Rhythm And Depth] : normal respiratory rhythm and effort [Exaggerated Use Of Accessory Muscles For Inspiration] : no accessory muscle use [Auscultation Breath Sounds / Voice Sounds] : lungs were clear to auscultation bilaterally [Abdomen Tenderness] : non-tender [Abdomen Soft] : soft [Abdomen Mass (___ Cm)] : no abdominal mass palpated [Abnormal Walk] : normal gait [Gait - Sufficient For Exercise Testing] : the gait was sufficient for exercise testing [Nail Clubbing] : no clubbing of the fingernails [Cyanosis, Localized] : no localized cyanosis [Petechial Hemorrhages (___cm)] : no petechial hemorrhages [Skin Color & Pigmentation] : normal skin color and pigmentation [] : no rash [No Venous Stasis] : no venous stasis [Skin Lesions] : no skin lesions [No Skin Ulcers] : no skin ulcer [No Xanthoma] : no  xanthoma was observed [Affect] : the affect was normal [Oriented To Time, Place, And Person] : oriented to person, place, and time [Mood] : the mood was normal [No Anxiety] : not feeling anxious [Normal S1] : normal S1 [Normal Rate] : normal [Normal S2] : normal S2 [No Murmur] : no murmurs heard [Nonpitting Edema] : nonpitting edema present [S3] : no S3 [S4] : no S4 [Right Carotid Bruit] : no bruit heard over the right carotid [Left Carotid Bruit] : no bruit heard over the left carotid [Right Femoral Bruit] : no bruit heard over the right femoral artery [Left Femoral Bruit] : no bruit heard over the left femoral artery

## 2023-10-04 ENCOUNTER — NON-APPOINTMENT (OUTPATIENT)
Age: 73
End: 2023-10-04

## 2023-10-16 ENCOUNTER — APPOINTMENT (OUTPATIENT)
Dept: GYNECOLOGIC ONCOLOGY | Facility: CLINIC | Age: 73
End: 2023-10-16
Payer: MEDICARE

## 2023-11-13 ENCOUNTER — APPOINTMENT (OUTPATIENT)
Dept: GYNECOLOGIC ONCOLOGY | Facility: CLINIC | Age: 73
End: 2023-11-13
Payer: MEDICARE

## 2023-11-13 VITALS
SYSTOLIC BLOOD PRESSURE: 127 MMHG | BODY MASS INDEX: 32.45 KG/M2 | HEART RATE: 92 BPM | WEIGHT: 195 LBS | DIASTOLIC BLOOD PRESSURE: 78 MMHG

## 2023-11-13 PROCEDURE — 99213 OFFICE O/P EST LOW 20 MIN: CPT

## 2024-01-09 ENCOUNTER — APPOINTMENT (OUTPATIENT)
Dept: CARDIOLOGY | Facility: CLINIC | Age: 74
End: 2024-01-09
Payer: MEDICARE

## 2024-01-09 ENCOUNTER — NON-APPOINTMENT (OUTPATIENT)
Age: 74
End: 2024-01-09

## 2024-01-09 VITALS
WEIGHT: 204 LBS | HEIGHT: 65 IN | DIASTOLIC BLOOD PRESSURE: 84 MMHG | BODY MASS INDEX: 33.99 KG/M2 | HEART RATE: 82 BPM | OXYGEN SATURATION: 96 % | SYSTOLIC BLOOD PRESSURE: 156 MMHG

## 2024-01-09 DIAGNOSIS — R60.0 LOCALIZED EDEMA: ICD-10-CM

## 2024-01-09 DIAGNOSIS — I65.23 OCCLUSION AND STENOSIS OF BILATERAL CAROTID ARTERIES: ICD-10-CM

## 2024-01-09 PROCEDURE — 93000 ELECTROCARDIOGRAM COMPLETE: CPT

## 2024-01-09 PROCEDURE — 99214 OFFICE O/P EST MOD 30 MIN: CPT

## 2024-02-08 ENCOUNTER — APPOINTMENT (OUTPATIENT)
Dept: CARDIOLOGY | Facility: CLINIC | Age: 74
End: 2024-02-08
Payer: MEDICARE

## 2024-02-08 PROCEDURE — 93306 TTE W/DOPPLER COMPLETE: CPT

## 2024-02-08 PROCEDURE — 93880 EXTRACRANIAL BILAT STUDY: CPT

## 2024-02-09 ENCOUNTER — RX RENEWAL (OUTPATIENT)
Age: 74
End: 2024-02-09

## 2024-02-12 ENCOUNTER — NON-APPOINTMENT (OUTPATIENT)
Age: 74
End: 2024-02-12

## 2024-02-16 ENCOUNTER — APPOINTMENT (OUTPATIENT)
Dept: RADIOLOGY | Facility: CLINIC | Age: 74
End: 2024-02-16
Payer: MEDICARE

## 2024-02-16 ENCOUNTER — OUTPATIENT (OUTPATIENT)
Dept: OUTPATIENT SERVICES | Facility: HOSPITAL | Age: 74
LOS: 1 days | End: 2024-02-16
Payer: MEDICARE

## 2024-02-16 DIAGNOSIS — Z90.710 ACQUIRED ABSENCE OF BOTH CERVIX AND UTERUS: Chronic | ICD-10-CM

## 2024-02-16 DIAGNOSIS — Z98.89 OTHER SPECIFIED POSTPROCEDURAL STATES: Chronic | ICD-10-CM

## 2024-02-16 DIAGNOSIS — M81.0 AGE-RELATED OSTEOPOROSIS WITHOUT CURRENT PATHOLOGICAL FRACTURE: ICD-10-CM

## 2024-02-16 PROCEDURE — 77080 DXA BONE DENSITY AXIAL: CPT | Mod: 26

## 2024-02-16 PROCEDURE — 77080 DXA BONE DENSITY AXIAL: CPT

## 2024-02-26 ENCOUNTER — APPOINTMENT (OUTPATIENT)
Dept: GYNECOLOGIC ONCOLOGY | Facility: CLINIC | Age: 74
End: 2024-02-26
Payer: MEDICARE

## 2024-02-26 VITALS
HEIGHT: 65 IN | HEART RATE: 87 BPM | WEIGHT: 202 LBS | BODY MASS INDEX: 33.66 KG/M2 | DIASTOLIC BLOOD PRESSURE: 80 MMHG | SYSTOLIC BLOOD PRESSURE: 130 MMHG

## 2024-02-26 PROCEDURE — 99213 OFFICE O/P EST LOW 20 MIN: CPT

## 2024-02-26 NOTE — DISCUSSION/SUMMARY
[FreeTextEntry1] : She remains CARLOS. -We discussed her surveillance.   -We have disc her prior MR. Her other provider planned further imaging.  She will have the report forwarded.  -She has planned exp mgmt for UI and doesn't have f/u with HW scheduled.  -We have discussed the finding of lipedema, which remains stable.     -We have discussed her BHM and her care which is directed by SP.       -We have disc bone health and the DEXA. She remains active she says and taks a MVI.   -Her instructions were reviewed.  -All Q/A. -She'll RTO in 3m, as per pt request.

## 2024-02-26 NOTE — REVIEW OF SYSTEMS
[de-identified] : See HPI [Negative] : Respiratory [FreeTextEntry4] : Stable [FreeTextEntry8] : See HPI

## 2024-02-26 NOTE — PHYSICAL EXAM
[FreeTextEntry1] : DC [de-identified] : 3+ L>R [de-identified] : Richard sites intact [de-identified] : No apparent hernia with Valsalva [de-identified] : No hernia palpable [de-identified] : neg mucosa but shortened.

## 2024-02-26 NOTE — HISTORY OF PRESENT ILLNESS
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 Singleton PCP: Dr. Theron Pinedo Breast Surgeon: Dr. Vang Rad Onc: Dr PATEL Mera Med Onc: Rossy Bentley Vasc: Dr White GI: Dr Gongora Uro/GYN: Dr. Db Downs Endo: Dr Mccormick Card: Omid Ruggiero ENT: Dr. Brewster  She RTO feeling well and notes no VB, VD, or pain. She saw ophtho and cataracts a bit worse but no surg yet. She wears pads for GEORGINA but doesn't feel she wants to pursue f/u eval/intervention with urogyn. Sees SP next month she confirms.   ROS: She reports no interval changes in GI habits.  We have discussed the diagnosis of "lipedema with only minimal lymphedema."      MR: Jun 2023 - CARLOS; no right inguinal hernia or mass. Incidental small subcm nodule in the right hepatic lobe   BHM: Directed by SP, pt confirms.  DEXA: Feb 2024 - osteopenia. disc with pt.  CCS: 4/2016

## 2024-03-11 ENCOUNTER — APPOINTMENT (OUTPATIENT)
Dept: PLASTIC SURGERY | Facility: CLINIC | Age: 74
End: 2024-03-11
Payer: MEDICARE

## 2024-03-11 VITALS
TEMPERATURE: 97.5 F | DIASTOLIC BLOOD PRESSURE: 87 MMHG | BODY MASS INDEX: 33.66 KG/M2 | WEIGHT: 202 LBS | HEIGHT: 65 IN | OXYGEN SATURATION: 95 % | HEART RATE: 95 BPM | SYSTOLIC BLOOD PRESSURE: 163 MMHG

## 2024-03-11 DIAGNOSIS — Z12.39 ENCOUNTER FOR OTHER SCREENING FOR MALIGNANT NEOPLASM OF BREAST: ICD-10-CM

## 2024-03-11 DIAGNOSIS — Z80.3 FAMILY HISTORY OF MALIGNANT NEOPLASM OF BREAST: ICD-10-CM

## 2024-03-11 PROCEDURE — 99212 OFFICE O/P EST SF 10 MIN: CPT

## 2024-03-11 NOTE — REVIEW OF SYSTEMS
[Negative] : Heme/Lymph [de-identified] : Diabetes  [FreeTextEntry5] : Problems with circulation, vascular disease and high blood pressure  [FreeTextEntry1] : Endometrial cancer in 2016

## 2024-03-11 NOTE — HISTORY OF PRESENT ILLNESS
[FreeTextEntry1] : Patient is a 73 year old female here today for a follow up visit.  She has a history of a benign left mammo wire localization breast biopsy She has a family history of breast cancer in her maternal grandmother 2/4/2023 Bilateral mammogram/ultrasound: benign, BI-RADS 1/2 GYNO/ONC: Dr. Coreas, sees him every 3-4 months for H/O endometrial cancer, all stable She denies any current breast concerns  Up to date with MDs

## 2024-03-11 NOTE — PHYSICAL EXAM
[Normocephalic] : normocephalic [Atraumatic] : atraumatic [EOMI] : extra ocular movement intact [Sclera nonicteric] : sclera nonicteric [Supple] : supple [No Supraclavicular Adenopathy] : no supraclavicular adenopathy [Examined in the supine and seated position] : examined in the supine and seated position [No dominant masses] : no dominant masses in right breast  [No dominant masses] : no dominant masses left breast [No Nipple Retraction] : no left nipple retraction [No Nipple Discharge] : no left nipple discharge [No Axillary Lymphadenopathy] : no left axillary lymphadenopathy [No Rashes] : no rashes [No Edema] : no edema [No Ulceration] : no ulceration [de-identified] : Upper inner scar with post-op retraction without change.

## 2024-03-11 NOTE — CONSULT LETTER
[Dear  ___] : Dear  [unfilled], [Courtesy Letter:] : I had the pleasure of seeing your patient, [unfilled], in my office today. [Please see my note below.] : Please see my note below. [Sincerely,] : Sincerely, [DrVerna  ___] : Dr. MITCHELL [DrVerna ___] : Dr. MITCHELL [FreeTextEntry3] : Valeria Martin, RPA-C Breast Surgery [address] [place] [address], [place] [address] (Phone) [phone] (Fax) [phone]

## 2024-03-19 ENCOUNTER — RX RENEWAL (OUTPATIENT)
Age: 74
End: 2024-03-19

## 2024-03-19 RX ORDER — ROSUVASTATIN CALCIUM 10 MG/1
10 TABLET, FILM COATED ORAL
Qty: 90 | Refills: 3 | Status: ACTIVE | COMMUNITY
Start: 2019-11-19 | End: 1900-01-01

## 2024-03-20 ENCOUNTER — APPOINTMENT (OUTPATIENT)
Dept: CARDIOLOGY | Facility: CLINIC | Age: 74
End: 2024-03-20
Payer: MEDICARE

## 2024-03-20 ENCOUNTER — NON-APPOINTMENT (OUTPATIENT)
Age: 74
End: 2024-03-20

## 2024-03-20 VITALS
OXYGEN SATURATION: 97 % | WEIGHT: 205 LBS | BODY MASS INDEX: 34.16 KG/M2 | HEART RATE: 77 BPM | DIASTOLIC BLOOD PRESSURE: 83 MMHG | HEIGHT: 65 IN | SYSTOLIC BLOOD PRESSURE: 155 MMHG

## 2024-03-20 DIAGNOSIS — H81.10 BENIGN PAROXYSMAL VERTIGO, UNSPECIFIED EAR: ICD-10-CM

## 2024-03-20 PROCEDURE — G2211 COMPLEX E/M VISIT ADD ON: CPT

## 2024-03-20 PROCEDURE — 93000 ELECTROCARDIOGRAM COMPLETE: CPT

## 2024-03-20 PROCEDURE — 99214 OFFICE O/P EST MOD 30 MIN: CPT

## 2024-03-20 NOTE — PHYSICAL EXAM
[General Appearance - Well Developed] : well developed [Normal Appearance] : normal appearance [Well Groomed] : well groomed [General Appearance - Well Nourished] : well nourished [No Deformities] : no deformities [General Appearance - In No Acute Distress] : no acute distress [Normal Conjunctiva] : the conjunctiva exhibited no abnormalities [Eyelids - No Xanthelasma] : the eyelids demonstrated no xanthelasmas [Normal Oral Mucosa] : normal oral mucosa [No Oral Pallor] : no oral pallor [No Oral Cyanosis] : no oral cyanosis [Normal Jugular Venous A Waves Present] : normal jugular venous A waves present [Normal Jugular Venous V Waves Present] : normal jugular venous V waves present [No Jugular Venous Hayward A Waves] : no jugular venous hayward A waves [Respiration, Rhythm And Depth] : normal respiratory rhythm and effort [Exaggerated Use Of Accessory Muscles For Inspiration] : no accessory muscle use [Auscultation Breath Sounds / Voice Sounds] : lungs were clear to auscultation bilaterally [Abdomen Soft] : soft [Abdomen Tenderness] : non-tender [Abdomen Mass (___ Cm)] : no abdominal mass palpated [Abnormal Walk] : normal gait [Nail Clubbing] : no clubbing of the fingernails [Gait - Sufficient For Exercise Testing] : the gait was sufficient for exercise testing [Cyanosis, Localized] : no localized cyanosis [Petechial Hemorrhages (___cm)] : no petechial hemorrhages [Skin Color & Pigmentation] : normal skin color and pigmentation [No Venous Stasis] : no venous stasis [] : no rash [Skin Lesions] : no skin lesions [No Skin Ulcers] : no skin ulcer [Oriented To Time, Place, And Person] : oriented to person, place, and time [No Xanthoma] : no  xanthoma was observed [Affect] : the affect was normal [Mood] : the mood was normal [No Anxiety] : not feeling anxious [Normal Rate] : normal [Normal S1] : normal S1 [Normal S2] : normal S2 [No Murmur] : no murmurs heard [Nonpitting Edema] : nonpitting edema present [S3] : no S3 [S4] : no S4 [Left Carotid Bruit] : no bruit heard over the left carotid [Right Carotid Bruit] : no bruit heard over the right carotid [Right Femoral Bruit] : no bruit heard over the right femoral artery [Left Femoral Bruit] : no bruit heard over the left femoral artery

## 2024-03-20 NOTE — HISTORY OF PRESENT ILLNESS
[FreeTextEntry1] : Lluvia David presented to the office today for a followup cardiovascular evaluation. She was last seen in the office in January, 2024.  She is now 73 years old, with a history of hypertension and dyslipidemia. She has diabetes. She is not known to have any underlying structural heart disease. In May of 2016 she had hysterectomy, which was complicated by distal arterial embolism. This was felt initially to potentially be a cardiac source of embolism or aortic, but her transesophageal echocardiogram was normal. 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 We evaluated her perioperatively. Her course was uncomplicated. She has remained on aspirin.  At the time of the last visit, she was feeling well.    She presents to the office today because of symptoms of dizziness.  Over the past month or so she has had multiple episodes of vertiginous symptoms, associated with either changing position from lying flat to sitting or standing, or even at times in a more mild fashion, rolling over in bed.  She saw her oncologist, and her primary care physician, both of them suggested a follow-up here, in addition to following up with ENT.  When she saw her primary care physician, her systolic blood pressure was about 110, reproducibly.  There was a discussion about the possibility that her blood pressure medication might need to be reduced.  She did some research online, and is concerned about the possibility of orthostatic hypotension.  She does not report reproducible symptoms suggestive of angina, heart failure or arrhythmia.

## 2024-03-20 NOTE — DISCUSSION/SUMMARY
[FreeTextEntry1] : Lluvia has hypertension and dyslipidemia, and diabetes. She is not known to have structural heart disease. She suffered an arterial embolus to the lower extremity, requiring amputation of toes.  Her biggest problem recently has been edema, and whether it represents venous insufficiency or lymphedema, I think our present management strategy with compression stockings as likely to be what we are left with.  Her blood pressure is better overall.  I would not change her medications at this time.  Her blood pressure has been well-controlled elsewhere.  Specifically today, she is not orthostatic on examination.  I reviewed her echocardiogram from January 2021, carotid ultrasound from December 2019 and venous Doppler from January 2023.  I reviewed her carotid ultrasound from February 2024, which revealed no significant plaque.  I reviewed her echocardiogram from February, 2024.  This revealed a normal ejection fraction, with mild mitral regurgitation.   Her symptoms sound most consistent with benign positional vertigo.  She has an appointment to see ENT in a few weeks.  We discussed Epley maneuvers, as a means to treat this, as well as vestibular therapy. [EKG obtained to assist in diagnosis and management of assessed problem(s)] : EKG obtained to assist in diagnosis and management of assessed problem(s)

## 2024-04-03 ENCOUNTER — APPOINTMENT (OUTPATIENT)
Dept: OTOLARYNGOLOGY | Facility: CLINIC | Age: 74
End: 2024-04-03
Payer: MEDICARE

## 2024-04-03 VITALS
WEIGHT: 197 LBS | DIASTOLIC BLOOD PRESSURE: 82 MMHG | SYSTOLIC BLOOD PRESSURE: 150 MMHG | HEIGHT: 65 IN | BODY MASS INDEX: 32.82 KG/M2 | HEART RATE: 75 BPM

## 2024-04-03 DIAGNOSIS — R42 DIZZINESS AND GIDDINESS: ICD-10-CM

## 2024-04-03 DIAGNOSIS — J31.0 CHRONIC RHINITIS: ICD-10-CM

## 2024-04-03 DIAGNOSIS — H90.3 SENSORINEURAL HEARING LOSS, BILATERAL: ICD-10-CM

## 2024-04-03 DIAGNOSIS — J34.2 DEVIATED NASAL SEPTUM: ICD-10-CM

## 2024-04-03 DIAGNOSIS — H93.8X3 OTHER SPECIFIED DISORDERS OF EAR, BILATERAL: ICD-10-CM

## 2024-04-03 DIAGNOSIS — E11.9 TYPE 2 DIABETES MELLITUS W/OUT COMPLICATIONS: ICD-10-CM

## 2024-04-03 PROCEDURE — 92570 ACOUSTIC IMMITANCE TESTING: CPT

## 2024-04-03 PROCEDURE — 92557 COMPREHENSIVE HEARING TEST: CPT

## 2024-04-03 PROCEDURE — 99214 OFFICE O/P EST MOD 30 MIN: CPT

## 2024-04-03 NOTE — ASSESSMENT
[FreeTextEntry1] :  Reviewed and reconciled medications, allergies, PMHx, PSHx, SocHx, FMHx.  The patient presents with h/o chronic sinusitis, b/l SNHL- wears b/l amplification, PND, clogged ears, and rhinitis presents today stating that after she got up from a bone density test she was very dizzy. She states that she experiences unsteady again after she got up from a sonogram 3/18/24. She states that 3/19/24 when she stood up from a lying down position she felt dizziness again. She states that she had a negative tilt table test with her cardiologist, and he suggested that it could possibly be BPPV. She states that the dizziness usually occurs when she turns from left to right while lying down or when she looks up while she is lying flat. She denies changes in hearing. she states that her ears always feel like she's under water, but unreleated to her dizziness. She denies family history of migraines and Meniere's disease.   CT scan April 2022: -middle ear and mastoid normal  Physical exam: -no lateralization to tuning forks -mildly deviated septum right -mildly inflamed turbinates bilaterally -dry mouth -uvula removed -vertical head roll: negative -horizontal head roll: negative -romberg: negative, but slightly unsteady -pupils equal and reactive -no nystagmus on horizontal, vertical, or rotatory gaze  Audio: -Type A Tymps AU -ETD AU -AD: Hearing WNL sloping mild/mod SNHL 250-8000 Hz -AS: Hearing essentially mild/mod SNHL 250-8000 Hz -88% understanding in right ear at 65 dB -96% understanding in left ear at 70 dB -no significant change from last audio   Plan:  Audio - results interpreted by Dr. Brewster and reviewed with the patient. -Ordered VNG, ECOG, and ABR -FU with results from VNG, ECOG, and ABR

## 2024-04-03 NOTE — ADDENDUM
[FreeTextEntry1] :  Documented by Mehran Lr acting as scribe for Dr. Brewster on 04/03/2024. All Medical record entries made by the Scribe were at my, Dr. Brewster, direction and personally dictated by me on 04/03/2024 . I have reviewed the chart and agree that the record accurately reflects my personal performance of the history, physical exam, assessment and plan. I have also personally directed, reviewed, and agreed with the discharge instructions.

## 2024-04-03 NOTE — PHYSICAL EXAM
[Hearing Calhoun Test (Tuning Fork On Forehead)] : no lateralization of tone [Midline] : trachea located in midline position [Normal] : orientation to person, place, and time: normal [Hearing Loss Right Only] : normal [Hearing Loss Left Only] : normal [de-identified] :  mildly inflamed turbinates bilaterally [de-identified] : dry mouth. uvula removed [] : Romberg test is negative [FreeTextEntry2] :  sinuses nontender to percussion.  sensations intact [de-identified] : pupils equal and reactive [de-identified] : no nystagmus on horizontal, vertical, or rotatory gaze

## 2024-04-03 NOTE — CONSULT LETTER
[Dear  ___] : Dear  [unfilled], [Courtesy Letter:] : I had the pleasure of seeing your patient, [unfilled], in my office today. [Please see my note below.] : Please see my note below. [Consult Closing:] : Thank you very much for allowing me to participate in the care of this patient.  If you have any questions, please do not hesitate to contact me. [Sincerely,] : Sincerely, [FreeTextEntry3] :  Jay Brewster MD FACS

## 2024-04-03 NOTE — HISTORY OF PRESENT ILLNESS
[de-identified] : The patient presents with h/o chronic sinusitis, b/l SNHL- wears b/l amplification, PND, clogged ears, and rhinitis presents today stating that after she got up from a bone density test she was very dizzy. She states that she experiences unsteady again after she got up from a sonogram 3/18/24. She states that 3/19/24 when she stood up from a lying down position she felt dizziness again. She states that she had a negative tilt table test with her cardiologist, and he suggested that it could possibly be BPPV. She states that the dizziness usually occurs when she turns from left to right while lying down or when she looks up while she is lying flat. She denies changes in hearing. she states that her ears always feel like she's under water, but unreleated to her dizziness. She denies family history of migraines and Meniere's disease.

## 2024-06-03 ENCOUNTER — APPOINTMENT (OUTPATIENT)
Dept: GYNECOLOGIC ONCOLOGY | Facility: CLINIC | Age: 74
End: 2024-06-03
Payer: MEDICARE

## 2024-06-03 VITALS
DIASTOLIC BLOOD PRESSURE: 79 MMHG | HEIGHT: 65 IN | HEART RATE: 82 BPM | SYSTOLIC BLOOD PRESSURE: 136 MMHG | BODY MASS INDEX: 33.82 KG/M2 | WEIGHT: 203 LBS

## 2024-06-03 DIAGNOSIS — C54.1 MALIGNANT NEOPLASM OF ENDOMETRIUM: ICD-10-CM

## 2024-06-03 DIAGNOSIS — M85.80 OTHER SPECIFIED DISORDERS OF BONE DENSITY AND STRUCTURE, UNSPECIFIED SITE: ICD-10-CM

## 2024-06-03 PROCEDURE — 99459 PELVIC EXAMINATION: CPT

## 2024-06-03 PROCEDURE — 99213 OFFICE O/P EST LOW 20 MIN: CPT

## 2024-06-03 NOTE — PHYSICAL EXAM
[Chaperone Present] : A chaperone was present in the examining room during all aspects of the physical examination [Abnormal] : Examination of extremities for edema and/or varicosities: Abnormal [Absent] : Adnexa(ae): Absent [Normal] : Recto-Vaginal Exam: Normal [92511] : A chaperone was present during the pelvic exam. [FreeTextEntry2] : Sita ESTRADA  [de-identified] : 3+ L>R [de-identified] : Richard sites intact [de-identified] : No hernia palpable [de-identified] : No apparent hernia with Valsalva [de-identified] : neg mucosa but shortened. RT changes.

## 2024-06-03 NOTE — DISCUSSION/SUMMARY
[Reviewed Clinical Lab Test(s)] : Results of clinical tests were reviewed. [Reviewed Radiology Report(s)] : Radiology reports were reviewed. [FreeTextEntry1] : She remains CARLOS. -We discussed her surveillance.   -She has planned exp mgmt for UI.  -We have discussed the finding of lipedema, which remains stable.     -We have discussed her BHM and her care which is directed by SP.       -We have disc bone health and the DEXA. She remains active she says and takes a MVI.   -Her Vertigo eval is ongoing.  -Her instructions were reviewed.  -All Q/A. -She'll RTO in 3m, as per pt request.

## 2024-06-03 NOTE — HISTORY OF PRESENT ILLNESS
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 Singleton PCP: Dr. Theron Pinedo Breast Surgeon: Dr. Vang Rad Onc: Dr PATEL Krueger Onc: Rossy Bentley Vasc: Dr White GI: Dr Gongora Uro/GYN: Dr. Db Downs Endo: Dr Mccormick Card: Omid Ruggiero ENT: Dr. Brewster  She RTO feeling well and notes no VB, VD, or pain. She was eval'd (Med, Cards) for vertigo and is seeing ENT. Also had thyroid bx. Stable  fxna nd no GI concerns. Saw Surg re BHM.    ROS: We have discussed the diagnosis of "lipedema with only minimal lymphedema."      MR: Jun 2023 - CARLOS; no right inguinal hernia or mass. Incidental small subcm nodule in the right hepatic lobe   BHM: Directed by SP, pt confirms.  DEXA: Feb 2024 - osteopenia. disc with pt.  CCS: 4/2016

## 2024-06-03 NOTE — REVIEW OF SYSTEMS
[Negative] : Musculoskeletal [Neuropathy] : neuropathy [Incontinence] : incontinence [de-identified] : See HPI [FreeTextEntry4] : Stable [FreeTextEntry8] : No changes since last visit [FreeTextEntry9] : See HPI

## 2024-06-10 ENCOUNTER — APPOINTMENT (OUTPATIENT)
Dept: OTOLARYNGOLOGY | Facility: CLINIC | Age: 74
End: 2024-06-10
Payer: MEDICARE

## 2024-06-10 PROCEDURE — 92537 CALORIC VSTBLR TEST W/REC: CPT

## 2024-06-10 PROCEDURE — 92540 BASIC VESTIBULAR EVALUATION: CPT

## 2024-07-19 ENCOUNTER — NON-APPOINTMENT (OUTPATIENT)
Age: 74
End: 2024-07-19

## 2024-07-19 ENCOUNTER — APPOINTMENT (OUTPATIENT)
Dept: CARDIOLOGY | Facility: CLINIC | Age: 74
End: 2024-07-19
Payer: MEDICARE

## 2024-07-19 VITALS — SYSTOLIC BLOOD PRESSURE: 125 MMHG | DIASTOLIC BLOOD PRESSURE: 75 MMHG

## 2024-07-19 VITALS
OXYGEN SATURATION: 98 % | DIASTOLIC BLOOD PRESSURE: 83 MMHG | SYSTOLIC BLOOD PRESSURE: 147 MMHG | WEIGHT: 200 LBS | BODY MASS INDEX: 39.27 KG/M2 | HEIGHT: 60 IN | HEART RATE: 70 BPM

## 2024-07-19 DIAGNOSIS — R60.0 LOCALIZED EDEMA: ICD-10-CM

## 2024-07-19 DIAGNOSIS — I65.23 OCCLUSION AND STENOSIS OF BILATERAL CAROTID ARTERIES: ICD-10-CM

## 2024-07-19 PROCEDURE — 93000 ELECTROCARDIOGRAM COMPLETE: CPT

## 2024-07-19 PROCEDURE — 99214 OFFICE O/P EST MOD 30 MIN: CPT

## 2024-07-19 PROCEDURE — G2211 COMPLEX E/M VISIT ADD ON: CPT

## 2024-11-25 ENCOUNTER — APPOINTMENT (OUTPATIENT)
Dept: GYNECOLOGIC ONCOLOGY | Facility: CLINIC | Age: 74
End: 2024-11-25
Payer: MEDICARE

## 2024-11-25 ENCOUNTER — NON-APPOINTMENT (OUTPATIENT)
Age: 74
End: 2024-11-25

## 2024-11-25 ENCOUNTER — RX RENEWAL (OUTPATIENT)
Age: 74
End: 2024-11-25

## 2024-11-25 VITALS
HEART RATE: 86 BPM | OXYGEN SATURATION: 96 % | DIASTOLIC BLOOD PRESSURE: 83 MMHG | BODY MASS INDEX: 37.89 KG/M2 | SYSTOLIC BLOOD PRESSURE: 128 MMHG | TEMPERATURE: 98.1 F | WEIGHT: 193 LBS | HEIGHT: 60 IN

## 2024-11-25 DIAGNOSIS — C55 MALIGNANT NEOPLASM OF UTERUS, PART UNSPECIFIED: ICD-10-CM

## 2024-11-25 PROCEDURE — 99459 PELVIC EXAMINATION: CPT

## 2024-11-25 PROCEDURE — 99213 OFFICE O/P EST LOW 20 MIN: CPT

## 2024-11-25 NOTE — REVIEW OF SYSTEMS
[Neuropathy] : neuropathy [Incontinence] : incontinence [Negative] : ENT/Mouth [FreeTextEntry4] : Stable [de-identified] : See HPI [de-identified] : See HPI

## 2024-11-25 NOTE — HISTORY OF PRESENT ILLNESS
[FreeTextEntry1] : Stage IB Serous and Undifferentiated EM Ca Leander TLH, BSO, LNS, Oment - 5/13/16 s/p finished 6 cycles RT Referred by: Dr. Gonzalez PCP: Dr. Macario Demarco Breast Surgeon: Dr. Ng Rad Onc: Dr GEORGIA Gross Med Onc: Fabiana Marrero Vasc: Dr Ahn GI: Dr Ruffin Uro/GYN: Dr. Parvez Kelley Endo: Dr Centeno Card: Newton Hogue ENT: Dr. Sinclair  Going to [place] for [place].   She RTO feeling well and notes no VB, VD, or pain. She cont to experience mild vertigo. She had an unrelated (she feels) fall in her bedroom with no head trauma. Spoke with PCP and saw Ortho. NTD. Stable  fxn and no GI concerns.     ROS: We have discussed the diagnosis of "lipedema with only minimal lymphedema."      MR: Jun 2023 - JASMYNE; no right inguinal hernia or mass. Incidental small subcm nodule in the right hepatic lobe. Dr Fu plans abd sono.   [facility]: Directed by SP, pt confirms.  DEXA: Feb 2024 - osteopenia. disc with pt.  CCS: 4/2016

## 2024-11-25 NOTE — PHYSICAL EXAM
[Chaperone Present] : A chaperone was present in the examining room during all aspects of the physical examination [Abnormal] : Examination of extremities for edema and/or varicosities: Abnormal [Absent] : Adnexa(ae): Absent [Normal] : Recto-Vaginal Exam: Normal [92504] : A chaperone was present during the pelvic exam. [FreeTextEntry2] : Rebekah  [de-identified] : 3+ L>R [de-identified] : Leander sites intact [de-identified] : No hernia palpable [de-identified] : neg mucosa but shortened. RT changes.

## 2024-11-25 NOTE — DISCUSSION/SUMMARY
[Reviewed Clinical Lab Test(s)] : Results of clinical tests were reviewed. [Reviewed Radiology Report(s)] : Radiology reports were reviewed. [FreeTextEntry1] : She remains JASMYNE. -We discussed her surveillance.   -She has planned exp mgmt for UI.  -We have discussed the finding of lipedema, which remains stable.     -We have discussed her BHM and her care which is directed by SP.       -We have disc bone health and her recent fall/evals, as well as the prior DEXA.     -Her instructions were reviewed.  -All Q/A. -She'll RTO in 3m, as per pt request.  -Effort for the visit includes the note prep, review of prior material, interview, exam, further documentation, and coordination of care.

## 2025-01-13 ENCOUNTER — APPOINTMENT (OUTPATIENT)
Dept: CARDIOLOGY | Facility: CLINIC | Age: 75
End: 2025-01-13
Payer: MEDICARE

## 2025-01-13 ENCOUNTER — NON-APPOINTMENT (OUTPATIENT)
Age: 75
End: 2025-01-13

## 2025-01-13 VITALS
WEIGHT: 192 LBS | HEART RATE: 84 BPM | BODY MASS INDEX: 37.69 KG/M2 | SYSTOLIC BLOOD PRESSURE: 138 MMHG | HEIGHT: 60 IN | OXYGEN SATURATION: 91 % | DIASTOLIC BLOOD PRESSURE: 79 MMHG

## 2025-01-13 DIAGNOSIS — I74.3 EMBOLISM AND THROMBOSIS OF ARTERIES OF THE LOWER EXTREMITIES: ICD-10-CM

## 2025-01-13 DIAGNOSIS — I65.23 OCCLUSION AND STENOSIS OF BILATERAL CAROTID ARTERIES: ICD-10-CM

## 2025-01-13 PROCEDURE — 93000 ELECTROCARDIOGRAM COMPLETE: CPT

## 2025-01-13 PROCEDURE — G2211 COMPLEX E/M VISIT ADD ON: CPT

## 2025-01-13 PROCEDURE — 99214 OFFICE O/P EST MOD 30 MIN: CPT

## 2025-01-13 NOTE — HISTORY OF PRESENT ILLNESS
[FreeTextEntry1] : Dara Parker presented to the office today for a followup cardiovascular evaluation. She was last seen in the office 6 months ago.  She is now 74 years old, with a history of hypertension and dyslipidemia. She has diabetes. She is not known to have any underlying structural heart disease. In May of 2016 she had hysterectomy, which was complicated by distal arterial embolism. This was felt initially to potentially be a cardiac source of embolism or aortic, but her transesophageal echocardiogram was normal. 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 We evaluated her perioperatively. Her course was uncomplicated. She has remained on aspirin.  At the time of the last visit, she was experiencing vertiginous symptoms.   She made an appointment to see ENT. We discussed Epley maneuvers, as a means to treat this, as well as vestibular therapy. She did go to ENT, and vertigo was confirmed.  She does not report reproducible symptoms suggestive of angina, heart failure or arrhythmia.   Her vertiginous symptoms persist but they are mild and limited to when she rolls over in bed. If she sits a lot during the day, she may get calf cramps, and pains in her thighs.  She has been taking co-Q10, without improvement.  These discomforts are perhaps slightly better on rosuvastatin than they were on simvastatin, but she believes that they may be connected to statin therapy.

## 2025-01-13 NOTE — CARDIOLOGY SUMMARY
[___] : [unfilled] Pt A&Ox4, all other VSS. Pt denies headache, dizziness, lightheadedness. Pt in no s/s of distress at this time. Pt A&Ox4, all other VSS. Pt denies headache, dizziness, lightheadedness. Pt denies chest pain/SOB. Pt in no s/s of distress at this time.

## 2025-01-13 NOTE — PHYSICAL EXAM
[Normal Appearance] : normal appearance [General Appearance - Well Developed] : well developed [Well Groomed] : well groomed [General Appearance - Well Nourished] : well nourished [No Deformities] : no deformities [General Appearance - In No Acute Distress] : no acute distress [Normal Conjunctiva] : the conjunctiva exhibited no abnormalities [Eyelids - No Xanthelasma] : the eyelids demonstrated no xanthelasmas [Normal Oral Mucosa] : normal oral mucosa [No Oral Cyanosis] : no oral cyanosis [No Oral Pallor] : no oral pallor [Normal Jugular Venous A Waves Present] : normal jugular venous A waves present [Normal Jugular Venous V Waves Present] : normal jugular venous V waves present [No Jugular Venous Doss A Waves] : no jugular venous doss A waves [Respiration, Rhythm And Depth] : normal respiratory rhythm and effort [Exaggerated Use Of Accessory Muscles For Inspiration] : no accessory muscle use [Auscultation Breath Sounds / Voice Sounds] : lungs were clear to auscultation bilaterally [Abdomen Soft] : soft [Abdomen Tenderness] : non-tender [Abdomen Mass (___ Cm)] : no abdominal mass palpated [Abnormal Walk] : normal gait [Gait - Sufficient For Exercise Testing] : the gait was sufficient for exercise testing [Nail Clubbing] : no clubbing of the fingernails [Cyanosis, Localized] : no localized cyanosis [Petechial Hemorrhages (___cm)] : no petechial hemorrhages [Skin Color & Pigmentation] : normal skin color and pigmentation [] : no rash [Skin Lesions] : no skin lesions [No Venous Stasis] : no venous stasis [No Skin Ulcers] : no skin ulcer [No Xanthoma] : no  xanthoma was observed [Oriented To Time, Place, And Person] : oriented to person, place, and time [Affect] : the affect was normal [No Anxiety] : not feeling anxious [Mood] : the mood was normal [Normal Rate] : normal [Normal S1] : normal S1 [Normal S2] : normal S2 [No Murmur] : no murmurs heard [Nonpitting Edema] : nonpitting edema present [S3] : no S3 [S4] : no S4 [Right Carotid Bruit] : no bruit heard over the right carotid [Left Carotid Bruit] : no bruit heard over the left carotid [Right Femoral Bruit] : no bruit heard over the right femoral artery [Left Femoral Bruit] : no bruit heard over the left femoral artery

## 2025-01-13 NOTE — DISCUSSION/SUMMARY
[FreeTextEntry1] : Dara has hypertension and dyslipidemia, and diabetes. She is not known to have structural heart disease. She suffered an arterial embolus to the lower extremity, requiring amputation of toes.  Her biggest problem recently has been edema, and whether it represents venous insufficiency or lymphedema, I think our present management strategy with compression stockings as likely to be what we are left with.  Her blood pressure is better overall.  I would not change her medications at this time.  Her blood pressure has been well-controlled elsewhere.     I reviewed her echocardiogram from January 2021, carotid ultrasound from December 2019 and venous Doppler from January 2023.  I reviewed her carotid ultrasound from February 2024, which revealed no significant plaque.  I reviewed her echocardiogram from February, 2024.  This revealed a normal ejection fraction, with mild mitral regurgitation.    It is not clear whether her symptoms are at all really connected to statin therapy.  For now, she will try magnesium supplement.  If after several weeks her frequent symptoms do not improve with magnesium, she will stop her rosuvastatin for a month, and see if her symptoms improve.  If they do, we will need to look for an alternative to statin therapy for her.  She will plan to see me again in 6 months. [EKG obtained to assist in diagnosis and management of assessed problem(s)] : EKG obtained to assist in diagnosis and management of assessed problem(s)

## 2025-03-18 ENCOUNTER — APPOINTMENT (OUTPATIENT)
Dept: PLASTIC SURGERY | Facility: CLINIC | Age: 75
End: 2025-03-18
Payer: MEDICARE

## 2025-03-18 VITALS
WEIGHT: 185 LBS | SYSTOLIC BLOOD PRESSURE: 113 MMHG | HEART RATE: 69 BPM | BODY MASS INDEX: 36.32 KG/M2 | DIASTOLIC BLOOD PRESSURE: 74 MMHG | TEMPERATURE: 97.3 F | HEIGHT: 60 IN | OXYGEN SATURATION: 96 %

## 2025-03-18 DIAGNOSIS — Z80.3 FAMILY HISTORY OF MALIGNANT NEOPLASM OF BREAST: ICD-10-CM

## 2025-03-18 DIAGNOSIS — N60.01 SOLITARY CYST OF RIGHT BREAST: ICD-10-CM

## 2025-03-18 PROCEDURE — 99213 OFFICE O/P EST LOW 20 MIN: CPT

## 2025-03-18 RX ORDER — VITAMIN E 268 MG
CAPSULE ORAL
Refills: 0 | Status: ACTIVE | COMMUNITY

## 2025-03-18 RX ORDER — EMPAGLIFLOZIN 10 MG/1
10 TABLET, FILM COATED ORAL
Refills: 0 | Status: ACTIVE | COMMUNITY

## 2025-03-18 NOTE — HISTORY OF PRESENT ILLNESS
[FreeTextEntry1] : Patient is a 74 year old female here today for a follow up visit.  She has a history of a benign left mammo wire localization breast biopsy She has a family history of breast cancer in her maternal grandmother 3/18/2024 Bilateral mammogram: There are scattered areas of fibroglandular density. Stable post-surgical changes of the left breast. Right clip. Bilateral ultrasound: Scar in left breast 11:00. Cyst in the right breast 5:00 (N1cm) measuring 3 mm. BI-RADS 2 GYNO/ONC: Dr. Singh, sees him every 3-4 months for H/O endometrial cancer, all stable She denies any current breast concerns  Up to date with MDs  She is seeing Dr. Bentley (Nephro) and started Jardiance for increased creatinine levels.

## 2025-03-18 NOTE — CONSULT LETTER
[Dear  ___] : Dear  [unfilled], [Courtesy Letter:] : I had the pleasure of seeing your patient, [unfilled], in my office today. [Please see my note below.] : Please see my note below. [Sincerely,] : Sincerely, [DrJacey  ___] : Dr. AGRAWAL [DrJacey ___] : Dr. AGRAWAL [FreeTextEntry3] : Vesna Guevara, RPA-C Breast Surgery [address] [place] [address], [place] [address] (Phone) [phone] (Fax) [phone]

## 2025-03-18 NOTE — CONSULT LETTER
[Dear  ___] : Dear  [unfilled], [Courtesy Letter:] : I had the pleasure of seeing your patient, [unfilled], in my office today. [Please see my note below.] : Please see my note below. [Sincerely,] : Sincerely, [DrJacey  ___] : Dr. AGRAWAL [DrJacey ___] : Dr. AGRAWAL [FreeTextEntry3] : Vesna Guevara, RPA-C Breast Surgery [address] [place] [address], [place] [address] (Phone) [phone] (Fax) [phone]

## 2025-03-18 NOTE — PHYSICAL EXAM
[Normocephalic] : normocephalic [Atraumatic] : atraumatic [EOMI] : extra ocular movement intact [Sclera nonicteric] : sclera nonicteric [Supple] : supple [No Supraclavicular Adenopathy] : no supraclavicular adenopathy [Examined in the supine and seated position] : examined in the supine and seated position [No dominant masses] : no dominant masses in right breast  [No dominant masses] : no dominant masses left breast [No Nipple Retraction] : no left nipple retraction [No Nipple Discharge] : no left nipple discharge [No Axillary Lymphadenopathy] : no left axillary lymphadenopathy [No Edema] : no edema [No Rashes] : no rashes [No Ulceration] : no ulceration [de-identified] : Upper inner scar with post-op retraction without change.

## 2025-03-18 NOTE — PHYSICAL EXAM
[Normocephalic] : normocephalic [Atraumatic] : atraumatic [EOMI] : extra ocular movement intact [Sclera nonicteric] : sclera nonicteric [Supple] : supple [No Supraclavicular Adenopathy] : no supraclavicular adenopathy [Examined in the supine and seated position] : examined in the supine and seated position [No dominant masses] : no dominant masses in right breast  [No dominant masses] : no dominant masses left breast [No Nipple Retraction] : no left nipple retraction [No Nipple Discharge] : no left nipple discharge [No Axillary Lymphadenopathy] : no left axillary lymphadenopathy [No Edema] : no edema [No Rashes] : no rashes [No Ulceration] : no ulceration [de-identified] : Upper inner scar with post-op retraction without change.

## 2025-03-18 NOTE — ASSESSMENT
[FreeTextEntry1] : Family history of breast cancer Clinical breast exam negative   1. Annual bilateral mammogram due now 2. Follow up office visit due in 1 year (after imaging) 3. Advised monthly self breast examinations and advised her to contact me if she has any concerns.

## 2025-03-24 ENCOUNTER — RX RENEWAL (OUTPATIENT)
Age: 75
End: 2025-03-24

## 2025-05-12 ENCOUNTER — APPOINTMENT (OUTPATIENT)
Dept: GYNECOLOGIC ONCOLOGY | Facility: CLINIC | Age: 75
End: 2025-05-12
Payer: MEDICARE

## 2025-05-12 DIAGNOSIS — C55 MALIGNANT NEOPLASM OF UTERUS, PART UNSPECIFIED: ICD-10-CM

## 2025-05-12 PROCEDURE — 99213 OFFICE O/P EST LOW 20 MIN: CPT

## 2025-05-12 PROCEDURE — 99459 PELVIC EXAMINATION: CPT

## 2025-05-12 NOTE — REVIEW OF SYSTEMS
[Neuropathy] : neuropathy [Incontinence] : incontinence [Negative] : Gastrointestinal [de-identified] : On new meds [FreeTextEntry4] : See HPI [de-identified] : See HPI

## 2025-05-12 NOTE — HISTORY OF PRESENT ILLNESS
[FreeTextEntry1] : Stage IB Serous and Undifferentiated EM Ca Leander TLH, BSO, LNS, Oment - 5/13/16 s/p finished 6 cycles RT Referred by: Dr. Gonzalez PCP: Dr. Macario Demarco Breast Surgeon: Dr. Ng Rad Onc: Dr GEORGIA Paul Onc: Fabiana Marrero Vasc: Dr Ahn GI: Dr Ruffin Uro/GYN: Dr. Parvez Kelley Endo: Dr Centeno Card: Newton Hogue ENT: Dr. Sinclair  She RTO feeling well and notes no VB, VD, or pain. She notes new meds, inc renal med to stabilize renal fxn; saw Dr Bentley. No GI concerns.     ROS: We have discussed the diagnosis of "lipedema with only minimal lymphedema."      MR: Jun 2023 - JASMYNE; no right inguinal hernia or mass. Incidental small subcm nodule in the right hepatic lobe. Dr Fu plans abd sono.   M: Directed by SP, pt confirms.  DEXA: Feb 2024 - osteopenia. disc with pt. Vit D suppl added.  CCS: 4/2016

## 2025-05-12 NOTE — DISCUSSION/SUMMARY
[Reviewed Clinical Lab Test(s)] : Results of clinical tests were reviewed. [Reviewed Radiology Report(s)] : Radiology reports were reviewed. [FreeTextEntry1] : She remains JASMYNE. -We discussed her surveillance.   -She has planned exp mgmt for UI.  -We have discussed the finding of lipedema, which remains stable.     -We have discussed her BHM and her care which is directed by SP.       -We have disc bone health and her recent fall/evals, as well as the prior DEXA.  She notes being on Vit D suppl now for low level.  -Disc her renal mgmt, incl the new meds to stabilize fxn.  -Her instructions were reviewed.  -All Q/A. -She'll RTO in 3m, as per pt request.  -Effort for the visit includes the note prep, review of prior material, interview, exam, further documentation, and coordination of care.

## 2025-05-12 NOTE — PHYSICAL EXAM
[Abnormal] : Examination of extremities for edema and/or varicosities: Abnormal [Absent] : Adnexa(ae): Absent [Normal] : Recto-Vaginal Exam: Normal [MA] : MA [FreeTextEntry2] : Blossom [de-identified] : 3+ L>R [de-identified] : Leander sites intact [de-identified] : No hernia palpable [de-identified] : neg mucosa but shortened. RT changes.

## 2025-07-21 ENCOUNTER — APPOINTMENT (OUTPATIENT)
Dept: CARDIOLOGY | Facility: CLINIC | Age: 75
End: 2025-07-21
Payer: MEDICARE

## 2025-07-21 VITALS
DIASTOLIC BLOOD PRESSURE: 76 MMHG | HEIGHT: 60 IN | OXYGEN SATURATION: 96 % | WEIGHT: 188 LBS | SYSTOLIC BLOOD PRESSURE: 128 MMHG | HEART RATE: 73 BPM | BODY MASS INDEX: 36.91 KG/M2

## 2025-07-21 DIAGNOSIS — I65.23 OCCLUSION AND STENOSIS OF BILATERAL CAROTID ARTERIES: ICD-10-CM

## 2025-07-21 PROCEDURE — 99214 OFFICE O/P EST MOD 30 MIN: CPT

## 2025-07-21 PROCEDURE — 93000 ELECTROCARDIOGRAM COMPLETE: CPT

## 2025-07-21 NOTE — DISCUSSION/SUMMARY
[FreeTextEntry1] : Dara has hypertension and dyslipidemia, and diabetes. She is not known to have structural heart disease. She suffered an arterial embolus to the lower extremity, requiring amputation of toes.  Her biggest problem recently has been edema, and whether it represents venous insufficiency or lymphedema, I think our present management strategy with compression stockings as likely to be what we are left with. Amlodipine does not seem to be connected, though we did discuss this today.  Her blood pressure is better overall.  I would not change her medications at this time.  Her blood pressure has been well-controlled elsewhere.     I reviewed her echocardiogram from January 2021, carotid ultrasound from December 2019 and venous Doppler from January 2023.  I reviewed her carotid ultrasound from February 2024, which revealed no significant plaque.  I reviewed her echocardiogram from February, 2024.  This revealed a normal ejection fraction, with mild mitral regurgitation.  She will see me in 6 months. [EKG obtained to assist in diagnosis and management of assessed problem(s)] : EKG obtained to assist in diagnosis and management of assessed problem(s)

## 2025-07-21 NOTE — HISTORY OF PRESENT ILLNESS
[FreeTextEntry1] : Dara Parker presented to the office today for a followup cardiovascular evaluation. She was last seen in the office 6 months ago.  She is now 75 years old, with a history of hypertension and dyslipidemia. She has diabetes. She is not known to have any underlying structural heart disease. In May of 2016 she had hysterectomy, which was complicated by distal arterial embolism. This was felt initially to potentially be a cardiac source of embolism or aortic, but her transesophageal echocardiogram was normal. 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 We evaluated her perioperatively. Her course was uncomplicated. She has remained on aspirin.  At the time of the July, 2024 visit, she was experiencing vertiginous symptoms.   She made an appointment to see ENT. We discussed Epley maneuvers, as a means to treat this, as well as vestibular therapy. She did go to ENT, and vertigo was confirmed.  At the time of her visit in January 2025 she reported some cramping issues, which did not improve while taking co-Q10.  We discussed holding her rosuvastatin for a time to see if this improved. It did not seem to make a difference when she went off it.   She does not report reproducible symptoms suggestive of angina, heart failure or arrhythmia.  She remains with edema, generally unchanged.  If she sits a lot during the day, she may get calf cramps, and pains in her thighs.  She has been taking co-Q10, and intermittently magnesium citrate without improvement.  These discomforts are not connected to statin therapy. She has recently been on Jardiance for her renal function, and has been tolerating it well.

## 2025-07-21 NOTE — PHYSICAL EXAM
[General Appearance - Well Developed] : well developed [Normal Appearance] : normal appearance [Well Groomed] : well groomed [General Appearance - Well Nourished] : well nourished [No Deformities] : no deformities [General Appearance - In No Acute Distress] : no acute distress [Normal Conjunctiva] : the conjunctiva exhibited no abnormalities [Eyelids - No Xanthelasma] : the eyelids demonstrated no xanthelasmas [Normal Oral Mucosa] : normal oral mucosa [No Oral Pallor] : no oral pallor [No Oral Cyanosis] : no oral cyanosis [Normal Jugular Venous A Waves Present] : normal jugular venous A waves present [Normal Jugular Venous V Waves Present] : normal jugular venous V waves present [No Jugular Venous Doss A Waves] : no jugular venous doss A waves [Respiration, Rhythm And Depth] : normal respiratory rhythm and effort [Exaggerated Use Of Accessory Muscles For Inspiration] : no accessory muscle use [Auscultation Breath Sounds / Voice Sounds] : lungs were clear to auscultation bilaterally [Abdomen Soft] : soft [Abdomen Tenderness] : non-tender [Abdomen Mass (___ Cm)] : no abdominal mass palpated [Abnormal Walk] : normal gait [Gait - Sufficient For Exercise Testing] : the gait was sufficient for exercise testing [Nail Clubbing] : no clubbing of the fingernails [Cyanosis, Localized] : no localized cyanosis [Petechial Hemorrhages (___cm)] : no petechial hemorrhages [Skin Color & Pigmentation] : normal skin color and pigmentation [] : no rash [No Venous Stasis] : no venous stasis [Skin Lesions] : no skin lesions [No Skin Ulcers] : no skin ulcer [No Xanthoma] : no  xanthoma was observed [Oriented To Time, Place, And Person] : oriented to person, place, and time [Affect] : the affect was normal [Mood] : the mood was normal [No Anxiety] : not feeling anxious [Normal Rate] : normal [Normal S1] : normal S1 [Normal S2] : normal S2 [No Murmur] : no murmurs heard [Nonpitting Edema] : nonpitting edema present [S3] : no S3 [S4] : no S4 [Right Carotid Bruit] : no bruit heard over the right carotid [Left Carotid Bruit] : no bruit heard over the left carotid [Right Femoral Bruit] : no bruit heard over the right femoral artery [Left Femoral Bruit] : no bruit heard over the left femoral artery

## 2025-09-15 ENCOUNTER — APPOINTMENT (OUTPATIENT)
Dept: GYNECOLOGIC ONCOLOGY | Facility: CLINIC | Age: 75
End: 2025-09-15
Payer: MEDICARE

## 2025-09-15 VITALS
BODY MASS INDEX: 36.52 KG/M2 | DIASTOLIC BLOOD PRESSURE: 74 MMHG | HEART RATE: 74 BPM | RESPIRATION RATE: 16 BRPM | SYSTOLIC BLOOD PRESSURE: 128 MMHG | TEMPERATURE: 97.3 F | WEIGHT: 186 LBS | HEIGHT: 60 IN | OXYGEN SATURATION: 96 %

## 2025-09-15 DIAGNOSIS — C54.1 MALIGNANT NEOPLASM OF ENDOMETRIUM: ICD-10-CM

## 2025-09-15 PROCEDURE — 99213 OFFICE O/P EST LOW 20 MIN: CPT

## 2025-09-15 PROCEDURE — 99459 PELVIC EXAMINATION: CPT
